# Patient Record
Sex: FEMALE | Race: WHITE | NOT HISPANIC OR LATINO | Employment: UNEMPLOYED | URBAN - METROPOLITAN AREA
[De-identification: names, ages, dates, MRNs, and addresses within clinical notes are randomized per-mention and may not be internally consistent; named-entity substitution may affect disease eponyms.]

---

## 2018-05-14 ENCOUNTER — HOSPITAL ENCOUNTER (EMERGENCY)
Facility: HOSPITAL | Age: 28
Discharge: HOME | End: 2018-05-14
Attending: EMERGENCY MEDICINE | Admitting: EMERGENCY MEDICINE
Payer: COMMERCIAL

## 2018-05-14 VITALS
BODY MASS INDEX: 20.42 KG/M2 | HEART RATE: 87 BPM | DIASTOLIC BLOOD PRESSURE: 74 MMHG | OXYGEN SATURATION: 100 % | SYSTOLIC BLOOD PRESSURE: 115 MMHG | RESPIRATION RATE: 16 BRPM | TEMPERATURE: 98 F | HEIGHT: 60 IN | WEIGHT: 104 LBS

## 2018-05-14 DIAGNOSIS — F10.929 ALCOHOL INTOXICATION (HCC): Primary | ICD-10-CM

## 2018-05-14 DIAGNOSIS — N61.1 ABSCESS OF RIGHT BREAST: ICD-10-CM

## 2018-05-14 LAB
ALBUMIN SERPL BCP-MCNC: 3.5 G/DL (ref 3.5–5)
ALP SERPL-CCNC: 136 U/L (ref 46–116)
ALT SERPL W P-5'-P-CCNC: 20 U/L (ref 12–78)
AMPHETAMINES SERPL QL SCN: NEGATIVE
ANION GAP SERPL CALCULATED.3IONS-SCNC: 6 MMOL/L (ref 4–13)
AST SERPL W P-5'-P-CCNC: 23 U/L (ref 5–45)
BARBITURATES UR QL: NEGATIVE
BASOPHILS # BLD AUTO: 0.04 THOUSANDS/ΜL (ref 0–0.1)
BASOPHILS NFR BLD AUTO: 1 % (ref 0–1)
BENZODIAZ UR QL: NEGATIVE
BILIRUB SERPL-MCNC: 0.2 MG/DL (ref 0.2–1)
BUN SERPL-MCNC: 8 MG/DL (ref 5–25)
CALCIUM SERPL-MCNC: 8.3 MG/DL (ref 8.3–10.1)
CHLORIDE SERPL-SCNC: 108 MMOL/L (ref 100–108)
CO2 SERPL-SCNC: 34 MMOL/L (ref 21–32)
COCAINE UR QL: NEGATIVE
CREAT SERPL-MCNC: 0.61 MG/DL (ref 0.6–1.3)
EOSINOPHIL # BLD AUTO: 0.04 THOUSAND/ΜL (ref 0–0.61)
EOSINOPHIL NFR BLD AUTO: 1 % (ref 0–6)
ERYTHROCYTE [DISTWIDTH] IN BLOOD BY AUTOMATED COUNT: 13.5 % (ref 11.6–15.1)
ETHANOL EXG-MCNC: 0.1 MG/DL
ETHANOL SERPL-MCNC: 290 MG/DL (ref 0–3)
EXT PREG TEST URINE: NORMAL
GFR SERPL CREATININE-BSD FRML MDRD: 123 ML/MIN/1.73SQ M
GLUCOSE SERPL-MCNC: 108 MG/DL (ref 65–140)
HCT VFR BLD AUTO: 42.3 % (ref 34.8–46.1)
HGB BLD-MCNC: 14 G/DL (ref 11.5–15.4)
LYMPHOCYTES # BLD AUTO: 1.07 THOUSANDS/ΜL (ref 0.6–4.47)
LYMPHOCYTES NFR BLD AUTO: 32 % (ref 14–44)
MAGNESIUM SERPL-MCNC: 2.4 MG/DL (ref 1.6–2.6)
MCH RBC QN AUTO: 30.4 PG (ref 26.8–34.3)
MCHC RBC AUTO-ENTMCNC: 33.1 G/DL (ref 31.4–37.4)
MCV RBC AUTO: 92 FL (ref 82–98)
METHADONE UR QL: NEGATIVE
MONOCYTES # BLD AUTO: 0.38 THOUSAND/ΜL (ref 0.17–1.22)
MONOCYTES NFR BLD AUTO: 11 % (ref 4–12)
NEUTROPHILS # BLD AUTO: 1.81 THOUSANDS/ΜL (ref 1.85–7.62)
NEUTS SEG NFR BLD AUTO: 55 % (ref 43–75)
OPIATES UR QL SCN: NEGATIVE
PCP UR QL: NEGATIVE
PLATELET # BLD AUTO: 217 THOUSANDS/UL (ref 149–390)
PMV BLD AUTO: 9.8 FL (ref 8.9–12.7)
POTASSIUM SERPL-SCNC: 3.7 MMOL/L (ref 3.5–5.3)
PROT SERPL-MCNC: 7.5 G/DL (ref 6.4–8.2)
RBC # BLD AUTO: 4.61 MILLION/UL (ref 3.81–5.12)
SODIUM SERPL-SCNC: 148 MMOL/L (ref 136–145)
THC UR QL: NEGATIVE
WBC # BLD AUTO: 3.34 THOUSAND/UL (ref 4.31–10.16)

## 2018-05-14 PROCEDURE — 93005 ELECTROCARDIOGRAM TRACING: CPT

## 2018-05-14 PROCEDURE — 85025 COMPLETE CBC W/AUTO DIFF WBC: CPT | Performed by: EMERGENCY MEDICINE

## 2018-05-14 PROCEDURE — 82075 ASSAY OF BREATH ETHANOL: CPT | Performed by: EMERGENCY MEDICINE

## 2018-05-14 PROCEDURE — 96360 HYDRATION IV INFUSION INIT: CPT

## 2018-05-14 PROCEDURE — 83735 ASSAY OF MAGNESIUM: CPT | Performed by: EMERGENCY MEDICINE

## 2018-05-14 PROCEDURE — 80307 DRUG TEST PRSMV CHEM ANLYZR: CPT | Performed by: EMERGENCY MEDICINE

## 2018-05-14 PROCEDURE — 80320 DRUG SCREEN QUANTALCOHOLS: CPT | Performed by: EMERGENCY MEDICINE

## 2018-05-14 PROCEDURE — 36415 COLL VENOUS BLD VENIPUNCTURE: CPT | Performed by: EMERGENCY MEDICINE

## 2018-05-14 PROCEDURE — 99284 EMERGENCY DEPT VISIT MOD MDM: CPT

## 2018-05-14 PROCEDURE — 80053 COMPREHEN METABOLIC PANEL: CPT | Performed by: EMERGENCY MEDICINE

## 2018-05-14 PROCEDURE — 96361 HYDRATE IV INFUSION ADD-ON: CPT

## 2018-05-14 PROCEDURE — 81025 URINE PREGNANCY TEST: CPT | Performed by: EMERGENCY MEDICINE

## 2018-05-14 RX ORDER — SULFAMETHOXAZOLE AND TRIMETHOPRIM 800; 160 MG/1; MG/1
1 TABLET ORAL EVERY 12 HOURS SCHEDULED
Qty: 14 TABLET | Refills: 0 | Status: SHIPPED | OUTPATIENT
Start: 2018-05-14 | End: 2018-05-21

## 2018-05-14 RX ORDER — LIDOCAINE HYDROCHLORIDE 10 MG/ML
5 INJECTION, SOLUTION EPIDURAL; INFILTRATION; INTRACAUDAL; PERINEURAL ONCE
Status: COMPLETED | OUTPATIENT
Start: 2018-05-14 | End: 2018-05-14

## 2018-05-14 RX ORDER — CEPHALEXIN 500 MG/1
500 CAPSULE ORAL EVERY 6 HOURS SCHEDULED
Qty: 40 CAPSULE | Refills: 0 | Status: SHIPPED | OUTPATIENT
Start: 2018-05-14 | End: 2018-05-17

## 2018-05-14 RX ORDER — SULFAMETHOXAZOLE AND TRIMETHOPRIM 800; 160 MG/1; MG/1
1 TABLET ORAL ONCE
Status: COMPLETED | OUTPATIENT
Start: 2018-05-14 | End: 2018-05-14

## 2018-05-14 RX ORDER — THIAMINE MONONITRATE (VIT B1) 100 MG
100 TABLET ORAL ONCE
Status: COMPLETED | OUTPATIENT
Start: 2018-05-14 | End: 2018-05-14

## 2018-05-14 RX ORDER — CEPHALEXIN 250 MG/1
500 CAPSULE ORAL ONCE
Status: COMPLETED | OUTPATIENT
Start: 2018-05-14 | End: 2018-05-14

## 2018-05-14 RX ADMIN — LIDOCAINE HYDROCHLORIDE 5 ML: 10 INJECTION, SOLUTION EPIDURAL; INFILTRATION; INTRACAUDAL; PERINEURAL at 13:11

## 2018-05-14 RX ADMIN — SULFAMETHOXAZOLE AND TRIMETHOPRIM 1 TABLET: 800; 160 TABLET ORAL at 12:51

## 2018-05-14 RX ADMIN — CEPHALEXIN 500 MG: 250 CAPSULE ORAL at 12:51

## 2018-05-14 RX ADMIN — Medication 100 MG: at 12:51

## 2018-05-14 RX ADMIN — SODIUM CHLORIDE 1000 ML: 0.9 INJECTION, SOLUTION INTRAVENOUS at 12:46

## 2018-05-14 NOTE — ED PROVIDER NOTES
History  Chief Complaint   Patient presents with    Breast Pain     Pt c/o pain under right breast that started about 2 weeks ago  Pt states she's been taking advil for the pain  Patient complains of right breast abscess for about 1 week draining purulent drainage  Daily alcohol but denies SI/ HI and doesn't want to go to detox or rehab  Patient with left eye bruising states from fall 1 week ago tripped on curb and face hit concrete - did not have LOC or headache  None       Past Medical History:   Diagnosis Date    Heart murmur        Past Surgical History:   Procedure Laterality Date    CARDIAC SURGERY         History reviewed  No pertinent family history  I have reviewed and agree with the history as documented  Social History   Substance Use Topics    Smoking status: Current Every Day Smoker     Types: Cigarettes    Smokeless tobacco: Never Used    Alcohol use Yes      Comment: Pt states she "drinks a lot "        Review of Systems   All other systems reviewed and are negative  Physical Exam  ED Triage Vitals [05/14/18 1155]   Temperature Pulse Respirations Blood Pressure SpO2   98 °F (36 7 °C) 81 18 107/76 100 %      Temp Source Heart Rate Source Patient Position - Orthostatic VS BP Location FiO2 (%)   Temporal Monitor Lying Right arm --      Pain Score       2           Orthostatic Vital Signs  Vitals:    05/14/18 1415 05/14/18 1606 05/14/18 1700 05/14/18 1730   BP: 110/73 93/51 107/69 115/74   Pulse: 86 86 87 87   Patient Position - Orthostatic VS:           Physical Exam   Constitutional: She is oriented to person, place, and time  She appears well-developed and well-nourished  HENT:   Head:       Nose: No sinus tenderness or nasal septal hematoma  Mouth/Throat: Oropharynx is clear and moist    Non tender left periorbital bruising   Eyes: Conjunctivae and EOM are normal  Pupils are equal, round, and reactive to light  Right conjunctiva is not injected   Left conjunctiva is not injected  Right eye exhibits normal extraocular motion  Left eye exhibits normal extraocular motion  Slit lamp exam:       The right eye shows no hyphema  The left eye shows no hyphema  Neck: Normal range of motion  Neck supple  No spinous process tenderness present  Cardiovascular: Normal rate, regular rhythm, normal heart sounds and intact distal pulses  Pulmonary/Chest: Effort normal and breath sounds normal  No respiratory distress  She has no wheezes  Abdominal: Soft  Bowel sounds are normal  She exhibits no distension  There is no tenderness  Musculoskeletal: Normal range of motion  Neurological: She is alert and oriented to person, place, and time  She has normal strength  No cranial nerve deficit or sensory deficit  She displays a negative Romberg sign  GCS eye subscore is 4  GCS verbal subscore is 5  GCS motor subscore is 6  Skin: Skin is warm and dry  No rash noted  Psychiatric: Her speech is slurred  Cognition and memory are normal  She expresses impulsivity  She exhibits a depressed mood  She expresses no suicidal plans and no homicidal plans  Nursing note and vitals reviewed        ED Medications  Medications   cephalexin (KEFLEX) capsule 500 mg (500 mg Oral Given 5/14/18 1251)   sulfamethoxazole-trimethoprim (BACTRIM DS) 800-160 mg per tablet 1 tablet (1 tablet Oral Given 5/14/18 1251)   lidocaine (PF) (XYLOCAINE-MPF) 1 % injection 5 mL (5 mL Infiltration Given 5/14/18 1311)   thiamine (VITAMIN B1) tablet 100 mg (100 mg Oral Given 5/14/18 1251)   sodium chloride 0 9 % bolus 1,000 mL (0 mL Intravenous Stopped 5/14/18 1823)       Diagnostic Studies  Results Reviewed     Procedure Component Value Units Date/Time    POCT alcohol breath test [[id number]]  (Normal) Resulted:  05/14/18 1801    Lab Status:  Final result Updated:  05/14/18 1801     EXTBreath Alcohol 0 103    Ethanol [[id number]]  (Abnormal) Collected:  05/14/18 1243    Lab Status:  Final result Specimen: Blood from Arm, Left Updated:  05/14/18 1322     Ethanol Lvl 290 (H) mg/dL     Comprehensive metabolic panel [[id number]]  (Abnormal) Collected:  05/14/18 1243    Lab Status:  Final result Specimen:  Blood from Arm, Left Updated:  05/14/18 1315     Sodium 148 (H) mmol/L      Potassium 3 7 mmol/L      Chloride 108 mmol/L      CO2 34 (H) mmol/L      Anion Gap 6 mmol/L      BUN 8 mg/dL      Creatinine 0 61 mg/dL      Glucose 108 mg/dL      Calcium 8 3 mg/dL      AST 23 U/L      ALT 20 U/L      Alkaline Phosphatase 136 (H) U/L      Total Protein 7 5 g/dL      Albumin 3 5 g/dL      Total Bilirubin 0 20 mg/dL      eGFR 123 ml/min/1 73sq m     Narrative:         National Kidney Disease Education Program recommendations are as follows:  GFR calculation is accurate only with a steady state creatinine  Chronic Kidney disease less than 60 ml/min/1 73 sq  meters  Kidney failure less than 15 ml/min/1 73 sq  meters  Magnesium [[id number]]  (Normal) Collected:  05/14/18 1243    Lab Status:  Final result Specimen:  Blood from Arm, Left Updated:  05/14/18 1315     Magnesium 2 4 mg/dL     Rapid drug screen, urine [[id number]]  (Normal) Collected:  05/14/18 1241    Lab Status:  Final result Specimen:  Urine from Urine, Clean Catch Updated:  05/14/18 1304     Amph/Meth UR Negative     Barbiturate Ur Negative     Benzodiazepine Urine Negative     Cocaine Urine Negative     Methadone Urine Negative     Opiate Urine Negative     PCP Ur Negative     THC Urine Negative    Narrative:         FOR MEDICAL PURPOSES ONLY  IF CONFIRMATION NEEDED PLEASE CONTACT THE LAB WITHIN 5 DAYS      Drug Screen Cutoff Levels:  AMPHETAMINE/METHAMPHETAMINES  1000 ng/mL  BARBITURATES     200 ng/mL  BENZODIAZEPINES     200 ng/mL  COCAINE      300 ng/mL  METHADONE      300 ng/mL  OPIATES      300 ng/mL  PHENCYCLIDINE     25 ng/mL  THC       50 ng/mL    CBC and differential [[id number]]  (Abnormal) Collected:  05/14/18 1243    Lab Status:  Final result Specimen:  Blood from Arm, Left Updated:  05/14/18 1259     WBC 3 34 (L) Thousand/uL      RBC 4 61 Million/uL      Hemoglobin 14 0 g/dL      Hematocrit 42 3 %      MCV 92 fL      MCH 30 4 pg      MCHC 33 1 g/dL      RDW 13 5 %      MPV 9 8 fL      Platelets 483 Thousands/uL      Neutrophils Relative 55 %      Lymphocytes Relative 32 %      Monocytes Relative 11 %      Eosinophils Relative 1 %      Basophils Relative 1 %      Neutrophils Absolute 1 81 (L) Thousands/µL      Lymphocytes Absolute 1 07 Thousands/µL      Monocytes Absolute 0 38 Thousand/µL      Eosinophils Absolute 0 04 Thousand/µL      Basophils Absolute 0 04 Thousands/µL     POCT pregnancy, urine [[id number]]  (Normal) Resulted:  05/14/18 1255    Lab Status:  Final result Updated:  05/14/18 1255     EXT PREG TEST UR (Ref: Negative) NEG                 No orders to display              Procedures  ECG 12 Lead Documentation  Date/Time: 5/14/2018 8:24 PM  Performed by: Fidel Romero by: Lisseth Lucia     Indications / Diagnosis:  Breast pain  ECG reviewed by me, the ED Provider: yes    Patient location:  ED  Previous ECG:     Previous ECG:  Unavailable  Interpretation:     Interpretation: normal    Quality:     Tracing quality:  Limited by artifact  Rate:     ECG rate:  83    ECG rate assessment: normal    Rhythm:     Rhythm: sinus rhythm    Ectopy:     Ectopy: none    QRS:     QRS axis:  Normal    QRS intervals:  Normal  Conduction:     Conduction: abnormal      Abnormal conduction: complete RBBB    ST segments:     ST segments:  Normal  T waves:     T waves: normal      Incision/Drainage  Date/Time: 5/14/2018 12:35 PM  Performed by: Fidel Romero by: Lisseth Lucia     Patient location:  ED  Consent:     Consent obtained:  Verbal    Consent given by:  Patient    Risks discussed:  Incomplete drainage, infection and pain    Alternatives discussed:  No treatment  Universal protocol:     Procedure explained and questions answered to patient or proxy's satisfaction: yes      Patient identity confirmed:  Verbally with patient  Location:     Type:  Abscess    Size:  2 cm    Location:  Trunk    Trunk location:  R breast  Pre-procedure details:     Skin preparation:  Betadine  Anesthesia (see MAR for exact dosages): Anesthesia method:  Local infiltration    Local anesthetic:  Lidocaine 1% w/o epi  Procedure details:     Complexity:  Simple    Incision types:  Stab incision    Scalpel blade:  10    Approach:  Open    Incision depth:  Skin    Wound management:  Probed and deloculated, irrigated with saline, extensive cleaning and debrided    Drainage:  Serosanguinous    Drainage amount:  Scant    Wound treatment:  Packing placed    Packing materials:  1/2 in gauze    Amount 1/2":  1 inch  Post-procedure details:     Patient tolerance of procedure: Tolerated well, no immediate complications           Phone Contacts  ED Phone Contact    ED Course  ED Course as of May 14 2125   Mon May 14, 2018   1410 Will repeat alcohol level around 6 pm                                MDM  Number of Diagnoses or Management Options  Abscess of right breast: new and requires workup  Alcohol intoxication [facility]): new and requires workup     Amount and/or Complexity of Data Reviewed  Clinical lab tests: ordered and reviewed    Patient Progress  Patient progress: improved    CritCare Time    Disposition  Final diagnoses:   Alcohol intoxication ([address] )   Abscess of right breast     Time reflects when diagnosis was documented in both MDM as applicable and the Disposition within this note     Time User Action Codes Description Comment    5/14/2018  6:06 PM Truong Rojas [F10 [id number]] Alcohol intoxication ([address] )     5/14/2018  6:06 PM Truong Rojas [N61 1] Abscess of right breast       ED Disposition     ED Disposition Condition Comment    Discharge  [facility] discharge to home/self care      Condition at discharge: Good        Follow-up Information     Follow up With Specialties Details Why Contact Info Additional Information    [facility] Emergency Medicine In 2 days  [address]  [address]  [phone] [address], [facility], [place], [place], [address]        Discharge Medication List as of 5/14/2018  6:10 PM      START taking these medications    Details   cephalexin (KEFLEX) 500 mg capsule Take 1 capsule (500 mg total) by mouth every 6 (six) hours for 10 doses, Starting Mon 5/14/2018, Until u 5/17/2018, Print      sulfamethoxazole-trimethoprim (BACTRIM DS) 800-160 mg per tablet Take 1 tablet by mouth every 12 (twelve) hours for 7 days, Starting Mon 5/14/2018, Until Mon 5/21/2018, Print           No discharge procedures on file      ED Provider  Electronically Signed by     Kaykay Salazar DO  05/14/18 4765

## 2018-05-14 NOTE — ED NOTES
In to lace abscess to breast  This nurse in the room with  during procedure  Education provided on care of wound  Will reeducate when patient more coherent  Patient given phone so she can speak with mother        Joceline Gomez RN  05/14/18 9359

## 2018-05-14 NOTE — ED NOTES
Patient states understanding on how to care for dressing and to follow up care        Юлия White RN  05/14/18 3817

## 2018-05-14 NOTE — DISCHARGE INSTRUCTIONS
Alcohol Intoxication   WHAT YOU NEED TO KNOW:   What is alcohol intoxication? Alcohol intoxication is a harmful physical condition caused when you drink more alcohol than your body can handle  It is also called ethanol poisoning, or being drunk  What are the physical signs and symptoms of alcohol intoxication? · Breath that smells like alcohol     · Blackouts or seizures    · Enlarged pupils    · Eye movements that are faster than normal for you    · Fast heartbeats and slow breaths     · Loss of balance, or no ability to walk straight or stand still    · Nausea and vomiting     · Slurred or loud speech  What behaviors are common with alcohol intoxication? · Quick mood changes: You feel happy and quickly become angry, or you easily become sad  You may act out violently  · Risky sexual behavior:  You have sex that is not protected, or you have sex with many people  · Work or school trouble: You have many absences or do not finish your work  How is alcohol intoxication diagnosed? Your healthcare provider will examine you  He will ask about your signs and symptoms and use of alcohol  These questions may include how much, how often, and what kind of alcohol you drink  He may ask you questions to test your memory and judgment  He may also send blood or urine samples to a lab  The samples are tested for alcohol and for signs of liver, kidney, or heart damage caused by alcohol  You may need to have these tests more than once  How is alcohol intoxication treated? · Medicines:      ¨ Sedative: This medicine is given to help you stay calm and relaxed  ¨ Antinausea medicine: This medicine may be given to calm your stomach and prevent vomiting  ¨ Glucose: This medicine may be given to increase the amount of sugar in your blood  ¨ Vitamin B1:  This is also called Thiamine  You may be given vitamin B1 if your levels are low from excess alcohol      · Brief intervention therapy:  A healthcare provider meets with you to discuss ways to control your risky behaviors, such as drinking and driving  This therapy also helps you set goals to decrease the amount of alcohol you drink  · Breathing support: You may need the following if you are so intoxicated that you cannot breathe well on your own:     Nuno Saliva You may need extra oxygen  if your blood oxygen level is lower than it should be  You may get oxygen through a mask placed over your nose and mouth or through small tubes placed in your nostrils  Ask your healthcare provider before you take off the mask or oxygen tubing  ¨ A ventilator  is a machine that gives you oxygen and breathes for you when you cannot breathe well on your own  An endotracheal (ET) tube is put into your mouth or nose and attached to the ventilator  You may need a trach if an ET tube cannot be placed  A trach is a tube put through an incision and into your windpipe  What are the risks of alcohol intoxication? Alcohol intoxication puts you at risk for disease and injury  Alcohol can damage your brain, liver, heart, kidneys, and lungs  You may be more likely to act violently when you are intoxicated  You may break the law, or harm yourself and others  Risky sexual behavior could lead to sexually transmitted diseases (STDs)  Alcohol intoxication and poisoning can put you into a coma (sleep that you cannot wake up from) and may be life-threatening  Where can I find more information? · Alcoholics Anonymous  Web Address: http://www braun info/  When should I contact my healthcare provider? Contact your healthcare provider if:  · You need help to stop drinking alcohol  · You have trouble with work or school because you drink too much alcohol  · You have physical or verbal fights because of alcohol  · You have questions or concerns about your condition or care  When should I seek immediate care?   Seek care immediately or call 911 if:  · You have sudden trouble breathing or chest pain  · You have a seizure  · You feel sad enough to harm yourself or others  · You have hallucinations (you see or hear things that are not real)  · You cannot stop vomiting  · You were in an accident because of alcohol  CARE AGREEMENT:   You have the right to help plan your care  Learn about your health condition and how it may be treated  Discuss treatment options with your caregivers to decide what care you want to receive  You always have the right to refuse treatment  The above information is an  only  It is not intended as medical advice for individual conditions or treatments  Talk to your doctor, nurse or pharmacist before following any medical regimen to see if it is safe and effective for you  © 2017 [address]  Information is for End User's use only and may not be sold, redistributed or otherwise used for commercial purposes  All illustrations and images included in CareNotes® are the copyrighted property of [employer]  or [employer]  Abscess   WHAT YOU NEED TO KNOW:   What is an abscess? An abscess is an area under the skin where pus (infected fluid) collects  An abscess is often caused by bacteria, fungi or other germs that get into an open wound  You can get an abscess anywhere on your body  What increases my risk for an abscess? · An animal bite    · A foreign object lodged under your skin    · Heavy or frequent sweating    · A health problem, such as diabetes or obesity    · Injecting illegal drugs  What are the signs and symptoms of an abscess? You may have a swollen mass that is red and painful  Pus may leak out of the mass  The pus will be white or yellow and may smell bad  You may have redness and pain days before the mass appears  You may have a fever and chills if the infection spreads  How is an abscess diagnosed? Your healthcare provider will examine the area   He will check to see if your abscess is draining  A sample of fluid from your abscess may show what is causing your infection  How is an abscess treated? · Incision and drainage  is a procedure used to remove pus and fluid from the abscess  Your healthcare provider will make a cut in the abscess so it can drain  Then gauze will be put into the wound and it will be covered with a bandage  · Surgery  may be needed to remove your abscess  Your healthcare provider may do this if the abscess is on your hands or buttocks  Surgery can decrease the risk that the abscess will come back  What can I do to care for my self? · Apply a warm compress to your abscess  This will help it open and drain  Wet a washcloth in warm, but not hot, water  Apply the compress for 10 minutes  Repeat this 4 times each day  Do not  press on an abscess or try to open it with a needle  You may push the bacteria deeper or into your blood  · Do not share your clothes, towels, or sheets with anyone  This can spread the infection to others  · Wash your hands often  This can help prevent the spread of germs  Use soap and water or an alcohol-based hand rub  What can I do to care for my wound after it is drained? · Care for your wound as directed  If your healthcare provider says it is okay, carefully remove the bandage and gauze packing  You may need to soak the gauze to get it out of your wound  Clean your wound and the area around it as directed  Dry the area and put on new, clean bandages  Change your bandages when they get wet or dirty  · Ask your healthcare provider how to change the gauze in your wound  Keep track of how many pieces of gauze are placed inside the wound  Do not put too much packing in the wound  Do not pack the gauze too tightly in your wound  When should I seek immediate care? · The area around your abscess becomes very painful, warm, or has red streaks  · You have a fever and chills      · Your heart is beating faster than usual      · You feel faint or confused  When should I contact my healthcare provider? · Your abscess gets bigger  · Your abscess returns  · You have questions or concerns about your condition or care  CARE AGREEMENT:   You have the right to help plan your care  Learn about your health condition and how it may be treated  Discuss treatment options with your caregivers to decide what care you want to receive  You always have the right to refuse treatment  The above information is an  only  It is not intended as medical advice for individual conditions or treatments  Talk to your doctor, nurse or pharmacist before following any medical regimen to see if it is safe and effective for you  © 2017 [address] [facility] Information is for End User's use only and may not be sold, redistributed or otherwise used for commercial purposes  All illustrations and images included in CareNotes® are the copyrighted property of [employer] M [employer]  or [employer] Torres

## 2018-05-15 LAB
ATRIAL RATE: 83 BPM
P AXIS: 62 DEGREES
PR INTERVAL: 118 MS
QRS AXIS: -14 DEGREES
QRSD INTERVAL: 138 MS
QT INTERVAL: 414 MS
QTC INTERVAL: 486 MS
T WAVE AXIS: 72 DEGREES
VENTRICULAR RATE: 83 BPM

## 2018-05-15 PROCEDURE — 93010 ELECTROCARDIOGRAM REPORT: CPT | Performed by: INTERNAL MEDICINE

## 2018-07-21 ENCOUNTER — HOSPITAL ENCOUNTER (INPATIENT)
Facility: HOSPITAL | Age: 28
LOS: 1 days | Discharge: LEFT BEFORE DISCHARGE | DRG: 770 | End: 2018-07-22
Attending: EMERGENCY MEDICINE | Admitting: INTERNAL MEDICINE
Payer: MEDICARE

## 2018-07-21 DIAGNOSIS — R57.1 HYPOVOLEMIC SHOCK (HCC): ICD-10-CM

## 2018-07-21 DIAGNOSIS — R94.31 PROLONGED QT INTERVAL: ICD-10-CM

## 2018-07-21 DIAGNOSIS — F10.929 ACUTE ALCOHOL INTOXICATION (HCC): Primary | ICD-10-CM

## 2018-07-21 DIAGNOSIS — F19.10 POLYSUBSTANCE ABUSE (HCC): ICD-10-CM

## 2018-07-21 DIAGNOSIS — E86.0 DEHYDRATION: ICD-10-CM

## 2018-07-21 PROCEDURE — 96372 THER/PROPH/DIAG INJ SC/IM: CPT

## 2018-07-21 PROCEDURE — 81025 URINE PREGNANCY TEST: CPT | Performed by: EMERGENCY MEDICINE

## 2018-07-21 RX ORDER — LORAZEPAM 2 MG/ML
2 INJECTION INTRAMUSCULAR ONCE
Status: COMPLETED | OUTPATIENT
Start: 2018-07-21 | End: 2018-07-21

## 2018-07-21 RX ORDER — HALOPERIDOL 5 MG/ML
5 INJECTION INTRAMUSCULAR ONCE
Status: COMPLETED | OUTPATIENT
Start: 2018-07-22 | End: 2018-07-21

## 2018-07-21 RX ADMIN — LORAZEPAM 2 MG: 2 INJECTION, SOLUTION INTRAMUSCULAR; INTRAVENOUS at 23:47

## 2018-07-21 RX ADMIN — HALOPERIDOL LACTATE 5 MG: 5 INJECTION INTRAMUSCULAR at 23:56

## 2018-07-22 VITALS
WEIGHT: 104.06 LBS | TEMPERATURE: 97 F | SYSTOLIC BLOOD PRESSURE: 109 MMHG | OXYGEN SATURATION: 99 % | DIASTOLIC BLOOD PRESSURE: 66 MMHG | HEIGHT: 60 IN | RESPIRATION RATE: 18 BRPM | BODY MASS INDEX: 20.43 KG/M2 | HEART RATE: 89 BPM

## 2018-07-22 PROBLEM — F10.929 ALCOHOLIC INTOXICATION WITH COMPLICATION (HCC): Status: ACTIVE | Noted: 2018-07-22

## 2018-07-22 PROBLEM — T50.901A OVERDOSE: Status: ACTIVE | Noted: 2018-07-22

## 2018-07-22 LAB
ALBUMIN SERPL BCP-MCNC: 3.7 G/DL (ref 3.5–5)
ALP SERPL-CCNC: 167 U/L (ref 46–116)
ALT SERPL W P-5'-P-CCNC: 48 U/L (ref 12–78)
AMPHETAMINES SERPL QL SCN: NEGATIVE
ANION GAP SERPL CALCULATED.3IONS-SCNC: 12 MMOL/L (ref 4–13)
ANION GAP SERPL CALCULATED.3IONS-SCNC: 7 MMOL/L (ref 4–13)
ANION GAP SERPL CALCULATED.3IONS-SCNC: 8 MMOL/L (ref 4–13)
ANION GAP SERPL CALCULATED.3IONS-SCNC: 9 MMOL/L (ref 4–13)
APAP SERPL-MCNC: <2 UG/ML (ref 10–30)
AST SERPL W P-5'-P-CCNC: 67 U/L (ref 5–45)
BARBITURATES UR QL: NEGATIVE
BASOPHILS # BLD AUTO: 0.02 THOUSANDS/ΜL (ref 0–0.1)
BASOPHILS # BLD AUTO: 0.05 THOUSANDS/ΜL (ref 0–0.1)
BASOPHILS NFR BLD AUTO: 0 % (ref 0–1)
BASOPHILS NFR BLD AUTO: 1 % (ref 0–1)
BENZODIAZ UR QL: NEGATIVE
BILIRUB SERPL-MCNC: 0.3 MG/DL (ref 0.2–1)
BUN SERPL-MCNC: 2 MG/DL (ref 5–25)
BUN SERPL-MCNC: 4 MG/DL (ref 5–25)
CALCIUM SERPL-MCNC: 6.1 MG/DL (ref 8.3–10.1)
CALCIUM SERPL-MCNC: 7.1 MG/DL (ref 8.3–10.1)
CALCIUM SERPL-MCNC: 7.8 MG/DL (ref 8.3–10.1)
CALCIUM SERPL-MCNC: 8.2 MG/DL (ref 8.3–10.1)
CHLORIDE SERPL-SCNC: 105 MMOL/L (ref 100–108)
CHLORIDE SERPL-SCNC: 109 MMOL/L (ref 100–108)
CHLORIDE SERPL-SCNC: 109 MMOL/L (ref 100–108)
CHLORIDE SERPL-SCNC: 115 MMOL/L (ref 100–108)
CO2 SERPL-SCNC: 26 MMOL/L (ref 21–32)
CO2 SERPL-SCNC: 26 MMOL/L (ref 21–32)
CO2 SERPL-SCNC: 27 MMOL/L (ref 21–32)
CO2 SERPL-SCNC: 27 MMOL/L (ref 21–32)
COCAINE UR QL: POSITIVE
CREAT SERPL-MCNC: 0.39 MG/DL (ref 0.6–1.3)
CREAT SERPL-MCNC: 0.46 MG/DL (ref 0.6–1.3)
CREAT SERPL-MCNC: 0.46 MG/DL (ref 0.6–1.3)
CREAT SERPL-MCNC: 0.5 MG/DL (ref 0.6–1.3)
EOSINOPHIL # BLD AUTO: 0.09 THOUSAND/ΜL (ref 0–0.61)
EOSINOPHIL # BLD AUTO: 0.15 THOUSAND/ΜL (ref 0–0.61)
EOSINOPHIL NFR BLD AUTO: 2 % (ref 0–6)
EOSINOPHIL NFR BLD AUTO: 3 % (ref 0–6)
ERYTHROCYTE [DISTWIDTH] IN BLOOD BY AUTOMATED COUNT: 13.9 % (ref 11.6–15.1)
ERYTHROCYTE [DISTWIDTH] IN BLOOD BY AUTOMATED COUNT: 14.3 % (ref 11.6–15.1)
ERYTHROCYTE [DISTWIDTH] IN BLOOD BY AUTOMATED COUNT: 14.5 % (ref 11.6–15.1)
ETHANOL SERPL-MCNC: 366 MG/DL (ref 0–3)
ETHANOL SERPL-MCNC: 87 MG/DL (ref 0–3)
ETHANOL SERPL-MCNC: <3 MG/DL (ref 0–3)
EXT PREG TEST URINE: NEGATIVE
GFR SERPL CREATININE-BSD FRML MDRD: 131 ML/MIN/1.73SQ M
GFR SERPL CREATININE-BSD FRML MDRD: 135 ML/MIN/1.73SQ M
GFR SERPL CREATININE-BSD FRML MDRD: 135 ML/MIN/1.73SQ M
GFR SERPL CREATININE-BSD FRML MDRD: 142 ML/MIN/1.73SQ M
GLUCOSE SERPL-MCNC: 102 MG/DL (ref 65–140)
GLUCOSE SERPL-MCNC: 103 MG/DL (ref 65–140)
GLUCOSE SERPL-MCNC: 88 MG/DL (ref 65–140)
GLUCOSE SERPL-MCNC: 94 MG/DL (ref 65–140)
HCT VFR BLD AUTO: 35.8 % (ref 34.8–46.1)
HCT VFR BLD AUTO: 36.6 % (ref 34.8–46.1)
HCT VFR BLD AUTO: 41.9 % (ref 34.8–46.1)
HGB BLD-MCNC: 11.8 G/DL (ref 11.5–15.4)
HGB BLD-MCNC: 12.2 G/DL (ref 11.5–15.4)
HGB BLD-MCNC: 14 G/DL (ref 11.5–15.4)
IMM GRANULOCYTES # BLD AUTO: 0.02 THOUSAND/UL (ref 0–0.2)
IMM GRANULOCYTES # BLD AUTO: 0.03 THOUSAND/UL (ref 0–0.2)
IMM GRANULOCYTES NFR BLD AUTO: 0 % (ref 0–2)
IMM GRANULOCYTES NFR BLD AUTO: 1 % (ref 0–2)
LYMPHOCYTES # BLD AUTO: 0.51 THOUSANDS/ΜL (ref 0.6–4.47)
LYMPHOCYTES # BLD AUTO: 1.49 THOUSANDS/ΜL (ref 0.6–4.47)
LYMPHOCYTES NFR BLD AUTO: 28 % (ref 14–44)
LYMPHOCYTES NFR BLD AUTO: 9 % (ref 14–44)
MCH RBC QN AUTO: 29.7 PG (ref 26.8–34.3)
MCH RBC QN AUTO: 29.8 PG (ref 26.8–34.3)
MCH RBC QN AUTO: 30.1 PG (ref 26.8–34.3)
MCHC RBC AUTO-ENTMCNC: 33 G/DL (ref 31.4–37.4)
MCHC RBC AUTO-ENTMCNC: 33.3 G/DL (ref 31.4–37.4)
MCHC RBC AUTO-ENTMCNC: 33.4 G/DL (ref 31.4–37.4)
MCV RBC AUTO: 89 FL (ref 82–98)
MCV RBC AUTO: 90 FL (ref 82–98)
MCV RBC AUTO: 90 FL (ref 82–98)
METHADONE UR QL: NEGATIVE
MONOCYTES # BLD AUTO: 0.58 THOUSAND/ΜL (ref 0.17–1.22)
MONOCYTES # BLD AUTO: 0.58 THOUSAND/ΜL (ref 0.17–1.22)
MONOCYTES NFR BLD AUTO: 10 % (ref 4–12)
MONOCYTES NFR BLD AUTO: 11 % (ref 4–12)
NEUTROPHILS # BLD AUTO: 3.02 THOUSANDS/ΜL (ref 1.85–7.62)
NEUTROPHILS # BLD AUTO: 4.74 THOUSANDS/ΜL (ref 1.85–7.62)
NEUTS SEG NFR BLD AUTO: 57 % (ref 43–75)
NEUTS SEG NFR BLD AUTO: 78 % (ref 43–75)
NRBC BLD AUTO-RTO: 0 /100 WBCS
NRBC BLD AUTO-RTO: 0 /100 WBCS
OPIATES UR QL SCN: NEGATIVE
PCP UR QL: NEGATIVE
PLATELET # BLD AUTO: 154 THOUSANDS/UL (ref 149–390)
PLATELET # BLD AUTO: 231 THOUSANDS/UL (ref 149–390)
PLATELET # BLD AUTO: 74 THOUSANDS/UL (ref 149–390)
PMV BLD AUTO: 10.1 FL (ref 8.9–12.7)
PMV BLD AUTO: 11.7 FL (ref 8.9–12.7)
PMV BLD AUTO: 9.9 FL (ref 8.9–12.7)
POTASSIUM SERPL-SCNC: 2.8 MMOL/L (ref 3.5–5.3)
POTASSIUM SERPL-SCNC: 3.3 MMOL/L (ref 3.5–5.3)
POTASSIUM SERPL-SCNC: 3.7 MMOL/L (ref 3.5–5.3)
POTASSIUM SERPL-SCNC: 4 MMOL/L (ref 3.5–5.3)
PROT SERPL-MCNC: 7.1 G/DL (ref 6.4–8.2)
RBC # BLD AUTO: 3.96 MILLION/UL (ref 3.81–5.12)
RBC # BLD AUTO: 4.05 MILLION/UL (ref 3.81–5.12)
RBC # BLD AUTO: 4.71 MILLION/UL (ref 3.81–5.12)
SALICYLATES SERPL-MCNC: <3 MG/DL (ref 3–20)
SODIUM SERPL-SCNC: 141 MMOL/L (ref 136–145)
SODIUM SERPL-SCNC: 143 MMOL/L (ref 136–145)
SODIUM SERPL-SCNC: 147 MMOL/L (ref 136–145)
SODIUM SERPL-SCNC: 149 MMOL/L (ref 136–145)
THC UR QL: NEGATIVE
TSH SERPL DL<=0.05 MIU/L-ACNC: 1.49 UIU/ML (ref 0.36–3.74)
WBC # BLD AUTO: 5.31 THOUSAND/UL (ref 4.31–10.16)
WBC # BLD AUTO: 5.97 THOUSAND/UL (ref 4.31–10.16)
WBC # BLD AUTO: 6.44 THOUSAND/UL (ref 4.31–10.16)

## 2018-07-22 PROCEDURE — 96365 THER/PROPH/DIAG IV INF INIT: CPT

## 2018-07-22 PROCEDURE — 85025 COMPLETE CBC W/AUTO DIFF WBC: CPT | Performed by: EMERGENCY MEDICINE

## 2018-07-22 PROCEDURE — 36415 COLL VENOUS BLD VENIPUNCTURE: CPT | Performed by: EMERGENCY MEDICINE

## 2018-07-22 PROCEDURE — 96360 HYDRATION IV INFUSION INIT: CPT

## 2018-07-22 PROCEDURE — 84443 ASSAY THYROID STIM HORMONE: CPT | Performed by: INTERNAL MEDICINE

## 2018-07-22 PROCEDURE — 80048 BASIC METABOLIC PNL TOTAL CA: CPT | Performed by: INTERNAL MEDICINE

## 2018-07-22 PROCEDURE — 80320 DRUG SCREEN QUANTALCOHOLS: CPT | Performed by: INTERNAL MEDICINE

## 2018-07-22 PROCEDURE — 99220 PR INITIAL OBSERVATION CARE/DAY 70 MINUTES: CPT | Performed by: INTERNAL MEDICINE

## 2018-07-22 PROCEDURE — 85025 COMPLETE CBC W/AUTO DIFF WBC: CPT | Performed by: INTERNAL MEDICINE

## 2018-07-22 PROCEDURE — 80307 DRUG TEST PRSMV CHEM ANLYZR: CPT | Performed by: EMERGENCY MEDICINE

## 2018-07-22 PROCEDURE — 99252 IP/OBS CONSLTJ NEW/EST SF 35: CPT | Performed by: PSYCHIATRY & NEUROLOGY

## 2018-07-22 PROCEDURE — 80320 DRUG SCREEN QUANTALCOHOLS: CPT | Performed by: PSYCHIATRY & NEUROLOGY

## 2018-07-22 PROCEDURE — 80053 COMPREHEN METABOLIC PANEL: CPT | Performed by: EMERGENCY MEDICINE

## 2018-07-22 PROCEDURE — 80329 ANALGESICS NON-OPIOID 1 OR 2: CPT | Performed by: EMERGENCY MEDICINE

## 2018-07-22 PROCEDURE — 99285 EMERGENCY DEPT VISIT HI MDM: CPT

## 2018-07-22 PROCEDURE — 93005 ELECTROCARDIOGRAM TRACING: CPT

## 2018-07-22 PROCEDURE — 80320 DRUG SCREEN QUANTALCOHOLS: CPT | Performed by: EMERGENCY MEDICINE

## 2018-07-22 PROCEDURE — 85027 COMPLETE CBC AUTOMATED: CPT | Performed by: INTERNAL MEDICINE

## 2018-07-22 RX ORDER — LORAZEPAM 0.5 MG/1
0.5 TABLET ORAL EVERY 8 HOURS PRN
Status: DISCONTINUED | OUTPATIENT
Start: 2018-07-22 | End: 2018-07-22 | Stop reason: HOSPADM

## 2018-07-22 RX ORDER — NICOTINE 21 MG/24HR
1 PATCH, TRANSDERMAL 24 HOURS TRANSDERMAL DAILY
Status: DISCONTINUED | OUTPATIENT
Start: 2018-07-22 | End: 2018-07-22 | Stop reason: HOSPADM

## 2018-07-22 RX ORDER — SODIUM CHLORIDE 9 MG/ML
125 INJECTION, SOLUTION INTRAVENOUS CONTINUOUS
Status: DISCONTINUED | OUTPATIENT
Start: 2018-07-22 | End: 2018-07-22

## 2018-07-22 RX ORDER — MAGNESIUM SULFATE HEPTAHYDRATE 40 MG/ML
2 INJECTION, SOLUTION INTRAVENOUS ONCE
Status: COMPLETED | OUTPATIENT
Start: 2018-07-22 | End: 2018-07-22

## 2018-07-22 RX ORDER — POTASSIUM CHLORIDE 20 MEQ/1
40 TABLET, EXTENDED RELEASE ORAL ONCE
Status: COMPLETED | OUTPATIENT
Start: 2018-07-22 | End: 2018-07-22

## 2018-07-22 RX ADMIN — POTASSIUM CHLORIDE 40 MEQ: 1500 TABLET, EXTENDED RELEASE ORAL at 14:03

## 2018-07-22 RX ADMIN — SODIUM CHLORIDE 1000 ML: 0.9 INJECTION, SOLUTION INTRAVENOUS at 02:15

## 2018-07-22 RX ADMIN — MAGNESIUM SULFATE HEPTAHYDRATE 2 G: 40 INJECTION, SOLUTION INTRAVENOUS at 01:27

## 2018-07-22 RX ADMIN — SODIUM CHLORIDE 125 ML/HR: 0.9 INJECTION, SOLUTION INTRAVENOUS at 09:26

## 2018-07-22 RX ADMIN — SODIUM BICARBONATE 25 MEQ: 84 INJECTION, SOLUTION INTRAVENOUS at 03:02

## 2018-07-22 RX ADMIN — SODIUM CHLORIDE 1000 ML: 0.9 INJECTION, SOLUTION INTRAVENOUS at 01:19

## 2018-07-22 RX ADMIN — FOLIC ACID: 5 INJECTION, SOLUTION INTRAMUSCULAR; INTRAVENOUS; SUBCUTANEOUS at 02:48

## 2018-07-22 RX ADMIN — SODIUM CHLORIDE 1000 ML: 0.9 INJECTION, SOLUTION INTRAVENOUS at 00:54

## 2018-07-22 RX ADMIN — SODIUM CHLORIDE 1000 ML: 0.9 INJECTION, SOLUTION INTRAVENOUS at 01:48

## 2018-07-22 RX ADMIN — NICOTINE 1 PATCH: 21 PATCH, EXTENDED RELEASE TRANSDERMAL at 09:27

## 2018-07-22 RX ADMIN — SODIUM CHLORIDE 1000 ML: 0.9 INJECTION, SOLUTION INTRAVENOUS at 05:45

## 2018-07-22 NOTE — ED NOTES
Pt incontinent of urine; linens changed, pt washed and repositioned  Pt assisted with sitting up in bed briefly; also resisted us washing her         Cara Gowers, RN  07/22/18 9034
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
## 2018-07-22 NOTE — CASE MANAGEMENT
Initial Clinical Review    Admission: Date/Time/Statement: 7/22/18 @ 0216     Orders Placed This Encounter   Procedures    Inpatient Admission (expected length of stay for this patient is greater than two midnights)     Standing Status:   Standing     Number of Occurrences:   1     Order Specific Question:   Admitting Physician     Answer:   Brenden Mendiola [[id number]]     Order Specific Question:   Level of Care     Answer:   Level 1 Stepdown [13]     Order Specific Question:   Estimated length of stay     Answer:   More than 2 Midnights     Order Specific Question:   Certification     Answer:   I certify that inpatient services are medically necessary for this patient for a duration of greater than two midnights  See H&P and MD Progress Notes for additional information about the patient's course of treatment  ED: Date/Time/Mode of Arrival:   ED Arrival Information     Expected Arrival Acuity Means of Arrival Escorted By Service Admission Type    - 7/21/2018 23:39 Emergent Ambulance [facility] Complaint    OVERDOSE          Chief Complaint:   Chief Complaint   Patient presents with    Overdose - Intentional     Brought in after police got a call that pt was "unresponsive" at [facility]; reported that she drank vodka and that duloxetine  Pt arrives uncooperative and not giving any information  History of Illness: 32 y o  female with no significant past medical history that was brought in to the ER by police after they were called the patient was unresponsive at [facility]  Patient was said to have been found by past and the unconscious on a bench  When police arrived she was lying on the ground confuse and was noted to be intoxicated  She was found with an empty bottle of duloxetine any as well as a bottle of vodka  When she arrived in the ER she was said to be combative and she received Ativan as well as Haldol    Her blood pressure had been low and she had received intermittent boluses of IV fluids  Patient unable to provide me any information as she is very sedated though just few minutes ago she stated she wants to be left alone      Her urine toxicology positive for cocaine  Her alcohol level is 366  Her boyfriend has also been just admitted this evening by myself for changes in mental status and etoh intoxication  ED Vital Signs:   ED Triage Vitals   Temperature Pulse Respirations Blood Pressure SpO2   07/22/18 0722 07/22/18 0012 07/22/18 0012 07/22/18 0012 07/22/18 0012   (!) 96 9 °F (36 1 °C) 72 16 108/57 98 %      Temp Source Heart Rate Source Patient Position - Orthostatic VS BP Location FiO2 (%)   07/22/18 0722 07/22/18 0012 07/22/18 0119 07/22/18 0119 --   Tympanic Monitor Lying Left arm       Pain Score       07/22/18 0345       No Pain        Wt Readings from Last 1 Encounters:   07/22/18 47 2 kg (104 lb 0 9 oz)     O2 3L N/C    Vital Signs (abnormal):   Date/Time  Temp  Pulse  Resp  BP  SpO2  O2 Device  Patient Position - Orthostatic VS   07/22/18 0840  97 9 °F (36 6 °C)  80  18   85/59  99 %  --  --   07/22/18 0815  --  77  18   89/54  100 %  --  --   07/22/18 0800  --  77  20  93/50  100 %  Nasal cannula       07/22/18 0530  --  75  13   88/50  100 %  --  --   07/22/18 0515  --  91  13   68/53  99 %           Abnormal Labs:    07/22/18 0653    Sodium 136 - 145 mmol/L 149     Potassium 3 5 - 5 3 mmol/L 3 7    Comment: 18Slightly Hemolyzed;  Results May be Affected   Chloride 100 - 108 mmol/L 115     CO2 21 - 32 mmol/L 26    Anion Gap 4 - 13 mmol/L 8    BUN 5 - 25 mg/dL 2     Creatinine 0 60 - 1 30 mg/dL 0 39        07/22/18 0004    Ethanol Lvl 0 - 3 mg/dL 366       Cocaine Urine Negative Positive         Diagnostic Test Results: No order    ED Treatment:   Medication Administration from 07/21/2018 2339 to 07/22/2018 0839       Date/Time Order Dose Route Action     07/21/2018 2347 LORazepam (ATIVAN) 2 mg/mL injection 2 mg 2 mg Intramuscular Given     07/21/2018 2356 haloperidol lactate (HALDOL) injection 5 mg 5 mg Intramuscular Given     07/22/2018 0054 sodium chloride 0 9 % bolus 1,000 mL 1,000 mL Intravenous New Bag     07/22/2018 0127 magnesium sulfate 2 g/50 mL IVPB (premix) 2 g 2 g Intravenous New Bag     32/14/8022 6447 folic acid 1 mg, thiamine (VITAMIN B1) 100 mg in sodium chloride 0 9 % 50 mL IVPB   Intravenous New Bag     07/22/2018 0119 sodium chloride 0 9 % bolus 1,000 mL 1,000 mL Intravenous New Bag     07/22/2018 0148 sodium chloride 0 9 % bolus 1,000 mL 1,000 mL Intravenous New Bag     07/22/2018 0215 sodium chloride 0 9 % bolus 1,000 mL 1,000 mL Intravenous New Bag     07/22/2018 0302 sodium bicarbonate 8 4 % injection 25 mEq 25 mEq Intravenous Given     07/22/2018 0545 sodium chloride 0 9 % bolus 1,000 mL 1,000 mL Intravenous New Bag          Past Medical/Surgical History: Active Ambulatory Problems     Diagnosis Date Noted    No Active Ambulatory Problems     Resolved Ambulatory Problems     Diagnosis Date Noted    No Resolved Ambulatory Problems     Past Medical History:   Diagnosis Date    Heart murmur        Admitting Diagnosis: Dehydration [E86 0]  Hypovolemic shock ([facility] [address] ) [R57 1]  Overdose [T50 901A]  Acute alcohol intoxication ([facility] [address] ) [F10 929]  Prolonged QT interval [R94 31]  Polysubstance abuse [F19 10]    Age/Sex: 32 y o  female    Assessment/Plan:   Altered mental status- Secondary to polysubstance abuse and drug overdose  Unclear intent if suicidal as found with empty bottle of duloxetine  She also is intoxicated with alcohol  I suspect she may also be taking another illicit drug that cannot be detected through basic drug panel  I will continue with supportive measures  She is able to maintain her airway for now       Hypotension- could be medication induced vs volume depletion  Continue with IV hydration  She has got about 4L so far  No obvious clinical signs to suggestive infection   Will obtain a cortisol level      Prolonged QTc- Noted on repeated EKG after administration of haldol  Will try to minimize use  Had receieved magnesium and bicarbonate in the ED  Keep on telemetry       ETOH abuse- Will place on thiamine and folic acid  IV ativan as needed     Tobacco abuse- Place on nicotine patch       Admission Orders:  Tele monitoring  Continual Observation  Restraints  Psychiatry cons    Scheduled Meds:   Current Facility-Administered Medications:  nicotine 1 patch Transdermal Daily   sodium chloride 125 mL/hr Intravenous Continuous     Continuous Infusions:   sodium chloride 125 mL/hr Last Rate: 125 mL/hr (07/22/18 0926)     PRN Meds:     ------------------------------------------------------------------------------------------------------------------------------------    7/22 Psych cons:  Assessment:  Alcohol intoxication  Alcohol withdrawal  Rule out unspecified depression     Plan:   Risks, benefits and possible side effects of Medications:   Risks, benefits, and possible side effects of medications explained to patient and patient verbalizes understanding  patient needs documented alcohol level below the legal limit which in the [facility] is below 80ng  dl before she could be considered appropriate for any DC AMA   Alcohol Level ordered for now and in the AM

## 2018-07-22 NOTE — CONSULTS
Consultation - [facility] Briseyda 32 y o  female MRN: [id number]  Unit/Bed#: -01 Encounter: [id number]    Assessment/Plan     Assessment:  Alcohol intoxication  Alcohol withdrawal  Rule out unspecified depression    Plan:   Risks, benefits and possible side effects of Medications:   Risks, benefits, and possible side effects of medications explained to patient and patient verbalizes understanding  patient needs documented alcohol level below the legal limit which in the [facility] is below 80ng  dl before she could be considered appropriate for any DC AMA  Alcohol Level ordered for now and in the AM     Chief Complaint: " I didn't try to #($*& kill myself"    History of Present Illness   Physician Requesting Consult: Naila Fine MD  Reason for Consult / Principal Problem:  Rule out suicidal ideation    Patient is a 32 y o  female presents with Signs of suicidal potential    BELOW IS A COPY OF THE PRIMARY TREATMENT TEAM HPI  HPI:  Neel Leon is a 32 y o  female with no significant past medical history that was brought in to the ER by police after they were called the patient was unresponsive at [facility]  Patient was said to have been found by past and the unconscious on a bench  When police arrived she was lying on the ground confuse and was noted to be intoxicated  She was found with an empty bottle of duloxetine any as well as a bottle of vodka  When she arrived in the ER she was said to be combative and she received Ativan as well as Haldol  Her blood pressure had been low and she had received intermittent boluses of IV fluids  Patient unable to provide me any information as she is very sedated though just few minutes ago she stated she wants to be left alone      Her urine toxicology positive for cocaine  Her alcohol level is 366  Her boyfriend has also been just admitted this evening by myself for changes in mental status and etoh intoxication  END OF COPY    Patient not talkive to me aside from telling me she isn't suicidal and puts her blamket over her head       Consults    Psychiatric Review Of Systems:  Denies noelle or anhedonia  Says she is not hopeleiss    Historical Information      Past Psychiatric History:   She reports she was just discharged a few days ago from [facility] and does not want to go there ever again      Substance Abuse History:  She demies substance use although she had cocaine in her urine "i don't know how it got there   "  She most likely is more than a casual drinker if she has alcohol level over 360 when she came to the ED    Family Psychiatric History:   Patient declined to answer    Social History  She says she is unemployed and lives with her mother    Traumatic History:   deferred    Past Medical History:   Diagnosis Date    Heart murmur        Medical Review Of Systems:  Review of Systems    Meds/Allergies   all current active meds have been reviewed  Allergies   Allergen Reactions    Latex Rash       Objective   Vital signs in last 24 hours:  Temp:  [96 9 °F (36 1 °C)-97 9 °F (36 6 °C)] 97 7 °F (36 5 °C)  HR:  [63-95] 80  Resp:  [13-20] 18  BP: ()/(37-61) 85/59      Intake/Output Summary (Last 24 hours) at 07/22/18 1153  Last data filed at 07/22/18 3569   Gross per 24 hour   Intake             3290 ml   Output              700 ml   Net             2590 ml       Mental Status Evaluation:  Appearance:  disheveled   Behavior:  restless and fidgety   Speech:  pressured   Mood:  irritable   Affect:  mood-congruent   Language: jocular   Thought Process:  disorganized   Thought Content:  normal   Perceptual Disturbances: None   Risk Potential: she denies at this time   Sensorium:  person   Cognition:  impaired due to alcohol intocixation   Consciousness:  sedated    Attention: Skewed to alcohol intoxication   Intellect: decreased   Fund of Knowledge: Affected by level of alcohol intoxication   Insight:  impaired due to alcohol   Judgment: impaired due to alcohol   Muscle Strength and Tone: within normal limits   Gait/Station: she is in bed so this is not assessed   Motor Activity: no abnormal movements     Lab Results:Results for Yolanda Rod (MRN [id number]) as of 7/22/2018 11:53   Ref   Range 7/22/2018 00:04 7/22/2018 01:48 7/22/2018 06:53   eGFR Latest Units: ml/min/1 73sq m 131  142   Sodium Latest Ref Range: 136 - 145 mmol/L 147 (H)  149 (H)   Potassium Latest Ref Range: 3 5 - 5 3 mmol/L 4 0  3 7   Chloride Latest Ref Range: 100 - 108 mmol/L 109 (H)  115 (H)   CO2 Latest Ref Range: 21 - 32 mmol/L 26  26   Anion Gap Latest Ref Range: 4 - 13 mmol/L 12  8   BUN Latest Ref Range: 5 - 25 mg/dL 4 (L)  2 (L)   Creatinine Latest Ref Range: 0 60 - 1 30 mg/dL 0 50 (L)  0 39 (L)   Glucose Latest Ref Range: 65 - 140 mg/dL 102  88   Calcium Latest Ref Range: 8 3 - 10 1 mg/dL 8 2 (L)  6 1 (L)   AST Latest Ref Range: 5 - 45 U/L 67 (H)     ALT Latest Ref Range: 12 - 78 U/L 48     Alkaline Phosphatase Latest Ref Range: 46 - 116 U/L 167 (H)     Total Protein Latest Ref Range: 6 4 - 8 2 g/dL 7 1     Albumin Latest Ref Range: 3 5 - 5 0 g/dL 3 7     Total Bilirubin Latest Ref Range: 0 20 - 1 00 mg/dL 0 30     WBC Latest Ref Range: 4 31 - 10 16 Thousand/uL 5 31  5 97   RBC Latest Ref Range: 3 81 - 5 12 Million/uL 4 71  3 96   Hemoglobin Latest Ref Range: 11 5 - 15 4 g/dL 14 0  11 8   Hematocrit Latest Ref Range: 34 8 - 46 1 % 41 9  35 8   MCV Latest Ref Range: 82 - 98 fL 89  90   MCH Latest Ref Range: 26 8 - 34 3 pg 29 7  29 8   MCHC Latest Ref Range: 31 4 - 37 4 g/dL 33 4  33 0   RDW Latest Ref Range: 11 6 - 15 1 % 13 9  14 3   Platelets Latest Ref Range: 149 - 390 Thousands/uL 231  74 (L)   MPV Latest Ref Range: 8 9 - 12 7 fL 10 1  11 7   nRBC Latest Units: /100 WBCs 0  0   Neutrophils Relative Latest Ref Range: 43 - 75 % 57  78 (H)   Lymphocytes Relative Latest Ref Range: 14 - 44 % 28  9 (L)   Monocytes Relative Latest Ref Range: 4 - 12 % 11  10   Eosinophils Relative Latest Ref Range: 0 - 6 % 3  2   Basophils Relative Latest Ref Range: 0 - 1 % 1  0   Neutrophils Absolute Latest Ref Range: 1 85 - 7 62 Thousands/µL 3 02  4 74   Lymphocytes Absolute Latest Ref Range: 0 60 - 4 47 Thousands/µL 1 49  0 51 (L)   Monocytes Absolute Latest Ref Range: 0 17 - 1 22 Thousand/µL 0 58  0 58   Eosinophils Absolute Latest Ref Range: 0 00 - 0 61 Thousand/µL 0 15  0 09   Basophils Absolute Latest Ref Range: 0 00 - 0 10 Thousands/µL 0 05  0 02   ACETAMINOPHEN LEVEL Latest Ref Range: 10 - 30 ug/mL <2 (L)     SALICYLATE LEVEL Latest Ref Range: 3 - 20 mg/dL <3 (L)     TSH 3RD GENERATON Latest Ref Range: 0 358 - 3 740 uIU/mL   1 494   MEDICAL ALCOHOL Latest Ref Range: 0 - 3 mg/dL 366 (H)     AMPH/METH Latest Ref Range: Negative   Negative    BARBITURATE URINE Latest Ref Range: Negative   Negative    BENZODIAZEPINE URINE Latest Ref Range: Negative   Negative    THC URINE Latest Ref Range: Negative   Negative    COCAINE URINE Latest Ref Range: Negative   Positive (A)    METHADONE URINE Latest Ref Range: Negative   Negative    OPIATE URINE Latest Ref Range: Negative   Negative    PHENCYCLIDINE URINE Latest Ref Range: Negative   Negative    EXT PREG TEST UR (Ref: Negative) Unknown  negative    Immature Grans Absolute Latest Ref Range: 0 00 - 0 20 Thousand/uL 0 02  0 03   Immat GRANS % Latest Ref Range: 0 - 2 % 0  1     I    Code Status: Level 1 - Full Code

## 2018-07-22 NOTE — ED NOTES
SBP 60's, pt arousable with deep stimulation, mumbles words, not opening eyes, eyelids mildly puffy now, Dr Katie Mariee at bedside, call to Dr Sofya Crane to inform of pt's BP and need for admission orders  2nd IV site started rt [facility]       Denice Sifuentes RN  07/22/18 2724

## 2018-07-22 NOTE — ED NOTES
Attempted to give report to CLARI Shepherd on ICU, RN to call back    Jesika Pike doing 1:1     Mercedez Gross RN  07/22/18 291 Irma Portillo RN  07/22/18 1511

## 2018-07-22 NOTE — ED NOTES
Verified sodium chloride order, 125 ml/hr, with Dr Pauline Oliva, per Doctor's verbal order holding infusion        Karen Zuleta, CLARI  07/22/18 5680

## 2018-07-22 NOTE — ED NOTES
Incontinent urine; linens changed and pt cleansed; pt awakened and sat up and told us to stop moving her       eLonora Hernandez RN  07/22/18 5797

## 2018-07-22 NOTE — ED NOTES
SBP 68; pt sleeping; when we move pt and awaken pt her BP improves  With stimulation and movement, pt then yelled at us to stop moving her  Dr Selvin Baumann present in room; liter of NSS hung and infusing  Dr Paolo Romero called and intormed her of pt's BP and asked her to please come down to write admit orders       Gricel Good RN  07/22/18 7627

## 2018-07-22 NOTE — ED NOTES
Hospital Supervisor sent denton Westtimothy Hodgkins from floor to do 1:1 on patient  Patient has ICU bed       Krishna Huynh RN  07/22/18 0196

## 2018-07-22 NOTE — PROGRESS NOTES
Belongings checked in ER, by [employer],   Belongings are full of feces  Red Bagged and sent to the Shed

## 2018-07-22 NOTE — ED PROVIDER NOTES
History  Chief Complaint   Patient presents with    Overdose - Intentional     Brought in after police got a call that pt was "unresponsive" at [facility]; reported that she drank vodka and that duloxetine  Pt arrives uncooperative and not giving any information  32year old female brought in by police after a bystander found her unconscious on a bench in front of [facility]  When police arrived, she was lying on the ground, conscious, but confused and appeared intoxicated  Patient refuses to answer any questions  Police brought an empty bottle of duloxetine which was found next to her with a bottle of vodka  Unclear if patient took anything as she refuses to provide any history  History provided by:  Police  History limited by:  Mental status change (intoxication)  Overdose - Intentional   Ingested substance:  Alcohol and prescription medication (possible duloxetine ingestion)  Time since incident:  1 hour (approximate)  Ingestion amount:  Unknown  Witnesses present: no    Called poison control: no    Incident location: outside [employer]  Context: substance was missing and unsupervised    Associated symptoms: agitation    Risk factors comment:  Prior ED visit with alcohol intoxication      None       Past Medical History:   Diagnosis Date    Heart murmur        Past Surgical History:   Procedure Laterality Date    CARDIAC SURGERY         History reviewed  No pertinent family history  I have reviewed and agree with the history as documented  Social History   Substance Use Topics    Smoking status: Current Every Day Smoker     Types: Cigarettes    Smokeless tobacco: Never Used    Alcohol use Yes      Comment: Pt states she "drinks a lot "        Review of Systems   Unable to perform ROS: Mental status change (intoxication)   Psychiatric/Behavioral: Positive for agitation  Physical Exam  Physical Exam   Constitutional: She appears well-developed and well-nourished  Non-toxic appearance  No distress  HENT:   Head: Normocephalic and atraumatic  Eyes: Conjunctivae and EOM are normal  Pupils are equal, round, and reactive to light  Neck: Normal range of motion  Neck supple  No tracheal deviation present  No thyromegaly present  Cardiovascular: Normal rate, regular rhythm, normal heart sounds and intact distal pulses  Pulmonary/Chest: Effort normal and breath sounds normal    Abdominal: Soft  Bowel sounds are normal  She exhibits no distension  There is no tenderness  Lymphadenopathy:     She has no cervical adenopathy  Neurological: She is alert  Skin: Skin is warm and dry  She is not diaphoretic  Psychiatric: Her affect is angry  Her speech is slurred  She is agitated and combative  She expresses impulsivity  Nursing note and vitals reviewed        Vital Signs  ED Triage Vitals   Temp Pulse Respirations Blood Pressure SpO2   -- 07/22/18 0012 07/22/18 0012 07/22/18 0012 07/22/18 0012    72 16 108/57 98 %      Temp src Heart Rate Source Patient Position - Orthostatic VS BP Location FiO2 (%)   -- 07/22/18 0012 07/22/18 0119 07/22/18 0119 --    Monitor Lying Left arm       Pain Score       07/22/18 0345       No Pain           Vitals:    07/22/18 0400 07/22/18 0410 07/22/18 0415 07/22/18 0430   BP: (!) 92/45 91/52 92/51    Pulse: 74 75 69 68   Patient Position - Orthostatic VS:  Lying Lying        Visual Acuity  Visual Acuity      Most Recent Value   L Pupil Size (mm)  2   R Pupil Size (mm)  2          ED Medications  Medications   LORazepam (ATIVAN) 2 mg/mL injection 2 mg (2 mg Intramuscular Given 7/21/18 2347)   haloperidol lactate (HALDOL) injection 5 mg (5 mg Intramuscular Given 7/21/18 2356)   sodium chloride 0 9 % bolus 1,000 mL (0 mL Intravenous Stopped 7/22/18 0210)   magnesium sulfate 2 g/50 mL IVPB (premix) 2 g (0 g Intravenous Stopped 2/29/84 1794)   folic acid 1 mg, thiamine (VITAMIN B1) 100 mg in sodium chloride 0 9 % 50 mL IVPB ( Intravenous Stopped 7/22/18 0312)   sodium chloride 0 9 % bolus 1,000 mL (0 mL Intravenous Stopped 7/22/18 0147)   sodium chloride 0 9 % bolus 1,000 mL (0 mL Intravenous Stopped 7/22/18 0249)   sodium chloride 0 9 % bolus 1,000 mL (0 mL Intravenous Stopped 7/22/18 0315)   sodium bicarbonate 8 4 % injection 25 mEq (25 mEq Intravenous Given 7/22/18 0302)       Diagnostic Studies  Results Reviewed     Procedure Component Value Units Date/Time    Rapid drug screen, urine [[id number]]  (Abnormal) Collected:  07/22/18 0148    Lab Status:  Final result Specimen:  Urine from Urine, Catheter Updated:  07/22/18 0211     Amph/Meth UR Negative     Barbiturate Ur Negative     Benzodiazepine Urine Negative     Cocaine Urine Positive (A)     Methadone Urine Negative     Opiate Urine Negative     PCP Ur Negative     THC Urine Negative    Narrative:         Presumptive report  If requested, specimen will be sent to reference lab for confirmation  FOR MEDICAL PURPOSES ONLY  IF CONFIRMATION NEEDED PLEASE CONTACT THE LAB WITHIN 5 DAYS      Drug Screen Cutoff Levels:  AMPHETAMINE/METHAMPHETAMINES  1000 ng/mL  BARBITURATES     200 ng/mL  BENZODIAZEPINES     200 ng/mL  COCAINE      300 ng/mL  METHADONE      300 ng/mL  OPIATES      300 ng/mL  PHENCYCLIDINE     25 ng/mL  THC       50 ng/mL    POCT pregnancy, urine [[id number]]  (Normal) Resulted:  07/22/18 0148    Lab Status:  Final result Updated:  07/22/18 0148     EXT PREG TEST UR (Ref: Negative) negative    Comprehensive metabolic panel [[id number]]  (Abnormal) Collected:  07/22/18 0004    Lab Status:  Final result Specimen:  Blood from Arm, Left Updated:  07/22/18 0109     Sodium 147 (H) mmol/L      Potassium 4 0 mmol/L      Chloride 109 (H) mmol/L      CO2 26 mmol/L      Anion Gap 12 mmol/L      BUN 4 (L) mg/dL      Creatinine 0 50 (L) mg/dL      Glucose 102 mg/dL      Calcium 8 2 (L) mg/dL      AST 67 (H) U/L      ALT 48 U/L      Alkaline Phosphatase 167 (H) U/L      Total Protein 7 1 g/dL      Albumin 3 7 g/dL      Total Bilirubin 0 30 mg/dL eGFR 131 ml/min/1 73sq m     Narrative:         National Kidney Disease Education Program recommendations are as follows:  GFR calculation is accurate only with a steady state creatinine  Chronic Kidney disease less than 60 ml/min/1 73 sq  meters  Kidney failure less than 15 ml/min/1 73 sq  meters      Salicylate level [[id number]]  (Abnormal) Collected:  07/22/18 0004    Lab Status:  Final result Specimen:  Blood from Arm, Left Updated:  19/80/05 0785     Salicylate Lvl <3 (L) mg/dL     Acetaminophen level [[id number]]  (Abnormal) Collected:  07/22/18 0004    Lab Status:  Final result Specimen:  Blood from Arm, Left Updated:  07/22/18 0109     Acetaminophen Level <2 (L) ug/mL     Ethanol [[id number]]  (Abnormal) Collected:  07/22/18 0004    Lab Status:  Final result Specimen:  Blood from Arm, Left Updated:  07/22/18 0053     Ethanol Lvl 366 (H) mg/dL     CBC and differential [[id number]] Collected:  07/22/18 0004    Lab Status:  Final result Specimen:  Blood from Arm, Left Updated:  07/22/18 0028     WBC 5 31 Thousand/uL      RBC 4 71 Million/uL      Hemoglobin 14 0 g/dL      Hematocrit 41 9 %      MCV 89 fL      MCH 29 7 pg      MCHC 33 4 g/dL      RDW 13 9 %      MPV 10 1 fL      Platelets 517 Thousands/uL      nRBC 0 /100 WBCs      Neutrophils Relative 57 %      Immat GRANS % 0 %      Lymphocytes Relative 28 %      Monocytes Relative 11 %      Eosinophils Relative 3 %      Basophils Relative 1 %      Neutrophils Absolute 3 02 Thousands/µL      Immature Grans Absolute 0 02 Thousand/uL      Lymphocytes Absolute 1 49 Thousands/µL      Monocytes Absolute 0 58 Thousand/µL      Eosinophils Absolute 0 15 Thousand/µL      Basophils Absolute 0 05 Thousands/µL                  No orders to display              Procedures  ECG 12 Lead Documentation  Date/Time: 7/22/2018 12:05 AM  Performed by: Alonso Bonilla  Authorized by: Alonso Bonilla     Indications / Diagnosis:  Intoxication, potential ingestion  Patient location: ED  Previous ECG:     Previous ECG:  Unavailable  Interpretation:     Interpretation: abnormal    Rate:     ECG rate:  63    ECG rate assessment: normal    Rhythm:     Rhythm: sinus rhythm    Ectopy:     Ectopy: none    QRS:     QRS axis:  Normal    QRS intervals: Wide  Conduction:     Conduction: abnormal      Abnormal conduction: complete RBBB    ST segments:     ST segments:  Normal  T waves:     T waves: inverted      Inverted:  V2  ECG 12 Lead Documentation  Date/Time: 7/22/2018 1:00 AM  Performed by: Shea Christiansen  Authorized by: Shea Christiansen     Indications / Diagnosis:  Repeat  ECG reviewed by me, the ED Provider: yes    Patient location:  ED  Previous ECG:     Previous ECG:  Compared to current    Comparison ECG info:  00:05 today, normal sinus rhythm with RBBB    Similarity:  Changes noted  Interpretation:     Interpretation: abnormal    Rate:     ECG rate:  63    ECG rate assessment: normal    Rhythm:     Rhythm: sinus rhythm    Ectopy:     Ectopy: none    QRS:     QRS axis:  Normal    QRS intervals:   Wide  Conduction:     Conduction: abnormal      Abnormal conduction: complete RBBB    ST segments:     ST segments:  Normal  T waves:     T waves: inverted      Inverted:  V2  Other findings:     Other findings: prolonged qTc interval    CriticalCare Time  Performed by: Shea Christiansen  Authorized by: Shea Christiansen     Critical care provider statement:     Critical care time (minutes):  40    Critical care start time:  7/22/2018 12:30 AM    Critical care end time:  7/22/2018 4:53 AM    Critical care time was exclusive of:  Separately billable procedures and treating other patients and teaching time    Critical care was necessary to treat or prevent imminent or life-threatening deterioration of the following conditions:  Dehydration and shock    Critical care was time spent personally by me on the following activities:  Evaluation of patient's response to treatment, examination of patient, re-evaluation of patient's condition and ordering and performing treatments and interventions           Phone Contacts  ED Phone Contact    ED Course  ED Course as of Jul 22 0457   Sun Jul 22, 2018   0056 MEDICAL ALCOHOL: (!) 366   0212 COCAINE URINE: (!) Positive                               MDM  Number of Diagnoses or Management Options  Acute alcohol intoxication ([facility] [address] ): new and requires workup  Dehydration: new and requires workup  Hypovolemic shock ([facility] [address] ): new and requires workup  Polysubstance abuse: new and requires workup  Prolonged QT interval: new and requires workup  Diagnosis management comments: 32year old female brought in by police for severe intoxication with possible duloxetine ingestion  Patient combative, moving all 4 extremities with normal strength on arrival  Sedated with haldol and ativan  Following haldol administration, patient developed a prolonged QTc interval  Magnesium and bicarb given  Patient also hypotensive  Improvement with IV fluid boluses  Patient appears dehydrated on exam  Likely volume depleted  Improvement with total of 4 L NS  Patient re-evaluated multiple times with recurring hypotension with repeated fluid boluses  Critical care time of approximately 40 minutes  Patient admitted to stepdown unit for close monitoring  Amount and/or Complexity of Data Reviewed  Clinical lab tests: ordered and reviewed  Obtain history from someone other than the patient: yes  Review and summarize past medical records: yes    Patient Progress  Patient progress: stable    The patient presented with a condition in which there was a high probability of imminent or life-threatening deterioration, and critical care services (excluding separately billable procedures) totalled 30-74 minutes          Disposition  Final diagnoses:   Prolonged QT interval   Acute alcohol intoxication ([facility] [address] )   Polysubstance abuse   Dehydration   Hypovolemic shock ([facility] [address] )     Time reflects when diagnosis was documented in both MDM as applicable and the Disposition within this note     Time User Action Codes Description Comment    7/22/2018  1:01 AM Mini LowellVelia sykeson Add [R94 31] Prolonged QT interval     7/22/2018  1:04 AM Louetta Delmy Add [F10 929] Acute alcohol intoxication ([facility] [address] )     7/22/2018  1:04 AM Louetta Delmy Modify [R94 31] Prolonged QT interval     7/22/2018  1:04 AM Louetta Delmy Modify [F10 929] Acute alcohol intoxication ([facility] [address] )     7/22/2018  2:14 AM Louetta Delmy Add [F19 10] Polysubstance abuse     7/22/2018  4:49 AM Mini Lowell, Velia Valenzuelaon Add [I95 9] Hypotension     7/22/2018  4:49 AM Velia Richardsonon Add [E86 0] Dehydration     7/22/2018  4:56 AM SusanaVelia treviñoon Add [R57 1] Hypovolemic shock ([facility] [address] )     7/22/2018  4:56 AM SusanaVelia dewey Remove [I95 9] Hypotension       ED Disposition     ED Disposition Condition Comment    Admit  Case was discussed with JOHNNY and the patient's admission status was agreed to be Admission Status: inpatient status to the service of Dr Xi Ken   Follow-up Information    None         Patient's Medications    No medications on file     No discharge procedures on file      ED Provider  Electronically Signed by           Yajaira Bai MD  07/22/18 7741

## 2018-07-22 NOTE — H&P
H&P Exam - Bridget Mendoza 32 y o  female MRN: [id number]    Unit/Bed#: ED 03 Encounter: [id number]        History of Present Illness     HPI:  Bridget Mendoza is a 32 y o  female with no significant past medical history that was brought in to the ER by police after they were called the patient was unresponsive at [facility]  Patient was said to have been found by past and the unconscious on a bench  When police arrived she was lying on the ground confuse and was noted to be intoxicated  She was found with an empty bottle of duloxetine any as well as a bottle of vodka  When she arrived in the ER she was said to be combative and she received Ativan as well as Haldol  Her blood pressure had been low and she had received intermittent boluses of IV fluids  Patient unable to provide me any information as she is very sedated though just few minutes ago she stated she wants to be left alone  Her urine toxicology positive for cocaine  Her alcohol level is 366  Her boyfriend has also been just admitted this evening by myself for changes in mental status and etoh intoxication  Historical Information   Past Medical History:   Diagnosis Date    Heart murmur      There is no problem list on file for this patient  Past Surgical History:   Procedure Laterality Date    CARDIAC SURGERY         Social History   History   Alcohol Use    Yes     Comment: Pt states she "drinks a lot "     History   Drug Use No     History   Smoking Status    Current Every Day Smoker    Types: Cigarettes   Smokeless Tobacco    Never Used       Family History: History reviewed  No pertinent family history  Meds/Allergies       Current Facility-Administered Medications:     sodium chloride 0 9 % bolus 1,000 mL, 1,000 mL, Intravenous, Once, Saeed Bui MD, Last Rate: 1,000 mL/hr at 07/22/18 0545, 1,000 mL at 07/22/18 0545  No current outpatient prescriptions on file      Allergies   Allergen Reactions    Latex Review of Systems  Unable to obtain      Objective   Vitals: Blood pressure (!) 88/50, pulse 75, resp  rate 13, weight 47 2 kg (104 lb), SpO2 100 %  Physical Exam   General- Deeply sedated  Withdraws to pain and very mildly to verbal stimuli before she went back sleeping  HEENT: Pupils equal bilateral, sclera anicteric,  Unable to full examine the mucous membranes  Neck: supple, no JVD, lymphadenopathy, thyromegaly  Heart: Regular rate and rhythm, S1S2 present  No murmur, rub or gallop  Lungs; Clear to auscultation bilaterally  No wheezing, crackles or rhonchi  No accessory muscle use or respiratory distress  Abdomen: soft, non-tender, non-distended, NABS  No guarding or rebound  No peritoneal sound or mass  Extremities: Scattered small scabs on both legs bilaterally  no clubbing, cyanosis, or edema  2+ pedal pulses bilaterally  Full range of motion  Neurologic:  Deeply sedated  Skin: warm and dry  No petechiae, purpura and with scattered scratch marks and small scab lesions on the lower extremities rash  Lab Results:     Results from last 7 days  Lab Units 07/22/18  0004   WBC Thousand/uL 5 31   HEMOGLOBIN g/dL 14 0   HEMATOCRIT % 41 9   PLATELETS Thousands/uL 231       Results from last 7 days  Lab Units 07/22/18  0004   SODIUM mmol/L 147*   POTASSIUM mmol/L 4 0   CHLORIDE mmol/L 109*   CO2 mmol/L 26   BUN mg/dL 4*   CREATININE mg/dL 0 50*   GLUCOSE RANDOM mg/dL 102   CALCIUM mg/dL 8 2*         EKG, Pathology, and Other Studies:  NSR with prolong qtc of 548, RBBB    Assessment/Plan     Altered mental status- Secondary to polysubstance abuse and drug overdose  Unclear intent if suicidal as found with empty bottle of duloxetine  She also is intoxicated with alcohol  I suspect she may also be taking another illicit drug that cannot be detected through basic drug panel  I will continue with supportive measures  She is able to maintain her airway for now       Hypotension- could be medication induced vs volume depletion  Continue with IV hydration  She has got about 4L so far  No obvious clinical signs to suggestive infection  Will obtain a cortisol level  Prolonged QTc- Noted on repeated EKG after administration of haldol  Will try to minimize use  Had receieved magnesium and bicarbonate in the ED  Keep on telemetry  ETOH abuse- Will place on thiamine and folic acid  IV ativan as needed    Tobacco abuse- Place on nicotine patch      Code Status: No Order      Counseling / Coordination of Care  Total floor / unit time spent today 40 minutes  Greater than 50% of total time was spent with the patient and / or family counseling and / or coordination of care  Anticipate at least 2 midnight stay  Portions of the record may have been created with voice recognition software  Occasional wrong word or "sound a like" substitutions may have occurred due to the inherent limitations of voice recognition software  Read the chart carefully and recognize, using context, where substitutions have occurred

## 2018-07-22 NOTE — ED NOTES
SBP 78; Dr Beach Alu aware and pressure bag applied to IV fluids  Pt is sleeping; respirations are even and non-labored       Rambo Ortiz RN  07/22/18 0421

## 2018-07-22 NOTE — ED NOTES
ETCO2 monitor applied and results currently 40; Dr Selvin Baumann in room and aware       Gricel Good RN  07/22/18 1512

## 2018-07-22 NOTE — ED NOTES
Continues to be uncooperative; attempted to bite staff, kick; not answering questions; will not allow us to cover her nakedbody; medicated as ordered with haldol  4 point restraints applied       Checo Crimes, CLARI  07/22/18 2580

## 2018-07-24 LAB
ATRIAL RATE: 61 BPM
ATRIAL RATE: 63 BPM
EXTERNAL CHLAMYDIA RESULT: NEGATIVE
N GONORRHOEA RRNA SPEC QL PROBE: NEGATIVE
P AXIS: 52 DEGREES
P AXIS: 63 DEGREES
PR INTERVAL: 136 MS
PR INTERVAL: 136 MS
QRS AXIS: -17 DEGREES
QRS AXIS: 16 DEGREES
QRSD INTERVAL: 138 MS
QRSD INTERVAL: 148 MS
QT INTERVAL: 494 MS
QT INTERVAL: 536 MS
QTC INTERVAL: 497 MS
QTC INTERVAL: 548 MS
T WAVE AXIS: 69 DEGREES
T WAVE AXIS: 70 DEGREES
VENTRICULAR RATE: 61 BPM
VENTRICULAR RATE: 63 BPM

## 2018-07-24 PROCEDURE — 93010 ELECTROCARDIOGRAM REPORT: CPT | Performed by: INTERNAL MEDICINE

## 2018-08-11 LAB
EXTERNAL HIV CONFIRMATION: NORMAL
EXTERNAL HIV SCREEN: NORMAL

## 2019-03-21 ENCOUNTER — HOSPITAL ENCOUNTER (EMERGENCY)
Facility: HOSPITAL | Age: 29
End: 2019-03-22
Attending: EMERGENCY MEDICINE | Admitting: EMERGENCY MEDICINE
Payer: MEDICARE

## 2019-03-21 DIAGNOSIS — R45.851 SUICIDAL IDEATIONS: ICD-10-CM

## 2019-03-21 DIAGNOSIS — F32.A DEPRESSION: Primary | ICD-10-CM

## 2019-03-21 LAB
ALBUMIN SERPL BCP-MCNC: 3.2 G/DL (ref 3.5–5)
ALP SERPL-CCNC: 76 U/L (ref 46–116)
ALT SERPL W P-5'-P-CCNC: 33 U/L (ref 12–78)
AMPHETAMINES SERPL QL SCN: NEGATIVE
ANION GAP SERPL CALCULATED.3IONS-SCNC: 7 MMOL/L (ref 4–13)
AST SERPL W P-5'-P-CCNC: 26 U/L (ref 5–45)
BARBITURATES UR QL: NEGATIVE
BASOPHILS # BLD AUTO: 0.03 THOUSANDS/ΜL (ref 0–0.1)
BASOPHILS NFR BLD AUTO: 1 % (ref 0–1)
BENZODIAZ UR QL: NEGATIVE
BILIRUB SERPL-MCNC: 0.3 MG/DL (ref 0.2–1)
BUN SERPL-MCNC: 16 MG/DL (ref 5–25)
CALCIUM SERPL-MCNC: 8 MG/DL (ref 8.3–10.1)
CHLORIDE SERPL-SCNC: 109 MMOL/L (ref 100–108)
CO2 SERPL-SCNC: 28 MMOL/L (ref 21–32)
COCAINE UR QL: NEGATIVE
CREAT SERPL-MCNC: 0.7 MG/DL (ref 0.6–1.3)
EOSINOPHIL # BLD AUTO: 0.06 THOUSAND/ΜL (ref 0–0.61)
EOSINOPHIL NFR BLD AUTO: 1 % (ref 0–6)
ERYTHROCYTE [DISTWIDTH] IN BLOOD BY AUTOMATED COUNT: 12 % (ref 11.6–15.1)
ETHANOL EXG-MCNC: 0 MG/DL
EXT PREG TEST URINE: NORMAL
GFR SERPL CREATININE-BSD FRML MDRD: 117 ML/MIN/1.73SQ M
GLUCOSE SERPL-MCNC: 102 MG/DL (ref 65–140)
HCT VFR BLD AUTO: 36.3 % (ref 34.8–46.1)
HGB BLD-MCNC: 12.3 G/DL (ref 11.5–15.4)
IMM GRANULOCYTES # BLD AUTO: 0.02 THOUSAND/UL (ref 0–0.2)
IMM GRANULOCYTES NFR BLD AUTO: 0 % (ref 0–2)
LYMPHOCYTES # BLD AUTO: 1.32 THOUSANDS/ΜL (ref 0.6–4.47)
LYMPHOCYTES NFR BLD AUTO: 21 % (ref 14–44)
MCH RBC QN AUTO: 31.3 PG (ref 26.8–34.3)
MCHC RBC AUTO-ENTMCNC: 33.9 G/DL (ref 31.4–37.4)
MCV RBC AUTO: 92 FL (ref 82–98)
METHADONE UR QL: NEGATIVE
MONOCYTES # BLD AUTO: 0.72 THOUSAND/ΜL (ref 0.17–1.22)
MONOCYTES NFR BLD AUTO: 12 % (ref 4–12)
NEUTROPHILS # BLD AUTO: 4.07 THOUSANDS/ΜL (ref 1.85–7.62)
NEUTS SEG NFR BLD AUTO: 65 % (ref 43–75)
NRBC BLD AUTO-RTO: 0 /100 WBCS
OPIATES UR QL SCN: NEGATIVE
PCP UR QL: NEGATIVE
PLATELET # BLD AUTO: 214 THOUSANDS/UL (ref 149–390)
PMV BLD AUTO: 10.6 FL (ref 8.9–12.7)
POTASSIUM SERPL-SCNC: 3.9 MMOL/L (ref 3.5–5.3)
PROT SERPL-MCNC: 6 G/DL (ref 6.4–8.2)
RBC # BLD AUTO: 3.93 MILLION/UL (ref 3.81–5.12)
SODIUM SERPL-SCNC: 144 MMOL/L (ref 136–145)
THC UR QL: NEGATIVE
WBC # BLD AUTO: 6.22 THOUSAND/UL (ref 4.31–10.16)

## 2019-03-21 PROCEDURE — 99285 EMERGENCY DEPT VISIT HI MDM: CPT

## 2019-03-21 PROCEDURE — 81025 URINE PREGNANCY TEST: CPT | Performed by: EMERGENCY MEDICINE

## 2019-03-21 PROCEDURE — 85025 COMPLETE CBC W/AUTO DIFF WBC: CPT | Performed by: EMERGENCY MEDICINE

## 2019-03-21 PROCEDURE — 36415 COLL VENOUS BLD VENIPUNCTURE: CPT | Performed by: EMERGENCY MEDICINE

## 2019-03-21 PROCEDURE — 80307 DRUG TEST PRSMV CHEM ANLYZR: CPT | Performed by: EMERGENCY MEDICINE

## 2019-03-21 PROCEDURE — 80053 COMPREHEN METABOLIC PANEL: CPT | Performed by: EMERGENCY MEDICINE

## 2019-03-21 PROCEDURE — 82075 ASSAY OF BREATH ETHANOL: CPT | Performed by: EMERGENCY MEDICINE

## 2019-03-22 VITALS
HEART RATE: 84 BPM | SYSTOLIC BLOOD PRESSURE: 135 MMHG | TEMPERATURE: 97.4 F | OXYGEN SATURATION: 100 % | DIASTOLIC BLOOD PRESSURE: 76 MMHG | RESPIRATION RATE: 18 BRPM

## 2019-03-22 NOTE — ED NOTES
[facility] transport to [facility] rescheduled pickup for [address]   03/22/19 [address]  03/22/19 2475

## 2019-03-22 NOTE — ED NOTES
Insurance Authorization:   Phone call placed to [employer]  Phone number: [phone]     Spoke to Carlton Ramirez        4 days approved counting 3/21/19  Level of care: inpatient  Review on 3/25/19  Authorization # will be given to accepting facility upon admission  EVS (Eligibility Verification System) called - [phone]  Automated system indicates: Pt is eligible for MA through [facility] is medical provider, [employer] health care  Patient requested referral to [facility] or [facility] if possible  [place] reviewing referral at this time

## 2019-03-22 NOTE — ED NOTES
Received a call from Clarisse Avilez in admissions department at [facility]   Pt was accepted there by Dr Regulo Paz

## 2019-03-22 NOTE — ED NOTES
[facility] transport arranged for 3pm Friday, but EMTALA not completed in hopes earlier transport can be arranged on first shift with an alternate transport company  No other transport co was able to transport tonight or schedule for tomorrow per their report  Called No[facility] GARC[facility] and [place]  Spoke with Cas Dan at [facility] [employer] and provided 3pm pickup at [employer] as current scheduled time, but said in the morning first shift would attempt to secure something earlier and would notify them if successful

## 2019-03-22 NOTE — EMTALA/ACUTE CARE TRANSFER
[address]  [address] [facility] [place] [address]  Dept: [phone]      EMTALA TRANSFER CONSENT    NAME Gino Nice                                         1990                              MRN [id number]    I have been informed of my rights regarding examination, treatment, and transfer   by Dr Eddie Polanco DO    Benefits: Specialized equipment and/or services available at the receiving facility (Include comment)________________________    Risks: Potential for delay in receiving treatment, Potential deterioration of medical condition      Consent for Transfer:  I acknowledge that my medical condition has been evaluated and explained to me by the emergency department physician or other qualified medical person and/or my attending physician, who has recommended that I be transferred to the service of  Accepting Physician: Dr Calton Mcburney at [address] Name, [address] : Binu Marquez  The above potential benefits of such transfer, the potential risks associated with such transfer, and the probable risks of not being transferred have been explained to me, and I fully understand them  The doctor has explained that, in my case, the benefits of transfer outweigh the risks  I agree to be transferred  I authorize the performance of emergency medical procedures and treatments upon me in both transit and upon arrival at the receiving facility  Additionally, I authorize the release of any and all medical records to the receiving facility and request they be transported with me, if possible  I understand that the safest mode of transportation during a medical emergency is an ambulance and that the Hospital advocates the use of this mode of transport  Risks of traveling to the receiving facility by car, including absence of medical control, life sustaining equipment, such as oxygen, and medical personnel has been explained to me and I fully understand them      ([address] CORRECT [address]OX BELOW)  [  ]  I consent to the stated transfer and to be transported by ambulance/helicopter  [  ]  I consent to the stated transfer, but refuse transportation by ambulance and accept full responsibility for my transportation by car  I understand the risks of non-ambulance transfers and I exonerate the Hospital and its staff from any deterioration in my condition that results from this refusal     X___________________________________________    DATE  19  TIME________  Signature of patient or legally responsible individual signing on patient behalf           RELATIONSHIP TO PATIENT_________________________          Provider Certification    NAME Gino Nice                                         1990                              MRN [id number]    A medical screening exam was performed on the above named patient  Based on the examination:    Condition Necessitating Transfer The primary encounter diagnosis was Depression  A diagnosis of Suicidal ideations was also pertinent to this visit  Patient Condition: The patient has been stabilized such that within reasonable medical probability, no material deterioration of the patient condition or the condition of the unborn child(kayleigh) is likely to result from the transfer    Reason for Transfer: Level of Care needed not available at this facility    Transfer Requirements: [address]   · Space available and qualified personnel available for treatment as acknowledged by Crisis  · Agreed to accept transfer and to provide appropriate medical treatment as acknowledged by       Dr Calton Mcburney  · Appropriate medical records of the examination and treatment of the patient are provided at the time of transfer   [address], [address] _______  · Transfer will be performed by qualified personnel from [facility]  and appropriate transfer equipment as required, including the use of necessary and appropriate life support measures      Provider Certification: I have examined the patient and explained the following risks and benefits of being transferred/refusing transfer to the patient/family:  General risk, such as traffic hazards, adverse weather conditions, rough terrain or turbulence, possible failure of equipment (including vehicle or aircraft), or consequences of actions of persons outside the control of the transport personnel, Unanticipated needs of medical equipment and personnel during transport, Risk of worsening condition, The possibility of a transport vehicle being unavailable      Based on these reasonable risks and benefits to the patient and/or the unborn child(kayleigh), and based upon the information available at the time of the patients examination, I certify that the medical benefits reasonably to be expected from the provision of appropriate medical treatments at another medical facility outweigh the increasing risks, if any, to the individuals medical condition, and in the case of labor to the unborn child, from effecting the transfer      X____________________________________________ DATE 03/22/19        TIME_______      ORIGINAL - SEND TO MEDICAL RECORDS   COPY - SEND WITH PATIENT DURING TRANSFER

## 2019-03-22 NOTE — ED NOTES
Patient is reporting that she is feeling suicidal with thoughts of running into traffic  She says she has had some depression most of her life, but was able to deal with it without help or medications until her father passed away in 2017  After that she says she began abusing alcohol to try to avoid feeling so sad and hopeless  She reports having inpatient detox and dual treatment when she became suicidal at that time  She met her fiance a year or so ago, and her family disliked him, so she has nothing to do with them anymore  They have lived together for the past year, but are currently homeless  She reports that together they have had periods where they drank alcohol excessively, and she was in [place] last year for SI and was also drinking a great deal then as well  She reports being clean for 6 months, then beginning to drink with her fiance only occasionally at first, but then more and more  She stopped on her own recently, and reports consuming no alcohol for the past week  She is requesting inpatient mental health or inpatient dual treatment  She says she cannot stop thinking about her father and missing him, and she feels she just doesn't want or have any reason to be alive any longer  She denies HI and psychosis

## 2019-03-22 NOTE — ED PROVIDER NOTES
History  Chief Complaint   Patient presents with    Suicidal     Multiple stressors led to pt (and her boyfriend) to consider suicide as a solution to their problems  Homeless, no cash, out of resources  Planned to walk out into traffic  This is a 58-year-old female with a history of alcohol abuse and depression who presents via ambulance with her fiance complaining of suicidal intention to walk in front of a car  She states that she does drink about a half bottle of hard alcohol daily but has not had a drink in approximately 1 week she does not have a history of withdrawal seizures  She is currently homeless  History provided by:  Patient and EMS personnel  Psychiatric Evaluation   Presenting symptoms: suicidal thoughts    Degree of incapacity (severity): Unable to specify  Onset quality:  Unable to specify  Timing:  Constant  Progression:  Unchanged  Context: alcohol use        None       Past Medical History:   Diagnosis Date    Cocaine abuse ([facility] [address] )     Heart murmur     Psychiatric disorder     Previous psych admissions  Past Surgical History:   Procedure Laterality Date    CARDIAC SURGERY         No family history on file  I have reviewed and agree with the history as documented  Social History     Tobacco Use    Smoking status: Current Every Day Smoker     Types: Cigarettes    Smokeless tobacco: Never Used   Substance Use Topics    Alcohol use: Not Currently     Comment: Detox from alcohol   Drug use: No        Review of Systems   Psychiatric/Behavioral: Positive for suicidal ideas  All other systems reviewed and are negative  Physical Exam  Physical Exam   Constitutional: She is oriented to person, place, and time  She appears well-developed and well-nourished  No distress  HENT:   Head: Normocephalic and atraumatic     Right Ear: External ear normal    Left Ear: External ear normal    Nose: Nose normal    Mouth/Throat: Oropharynx is clear and moist    Eyes: Pupils are equal, round, and reactive to light  EOM are normal  Right eye exhibits no discharge  Left eye exhibits no discharge  No scleral icterus  Neck: Neck supple  No tracheal deviation present  Cardiovascular: Normal rate, regular rhythm and intact distal pulses  Exam reveals no gallop and no friction rub  No murmur heard  Pulmonary/Chest: Effort normal and breath sounds normal  No stridor  No respiratory distress  She has no wheezes  She has no rales  She exhibits no tenderness  Abdominal: Soft  Bowel sounds are normal  She exhibits no distension and no mass  There is no tenderness  There is no rebound and no guarding  Musculoskeletal: Normal range of motion  She exhibits no edema, tenderness or deformity  Neurological: She is alert and oriented to person, place, and time  She displays normal reflexes  No cranial nerve deficit or sensory deficit  She exhibits normal muscle tone  Skin: Skin is warm and dry  No rash noted  She is not diaphoretic  Psychiatric:   Flat affect with suicidal ideation to walk in front of a car currently homeless   Nursing note and vitals reviewed        Vital Signs  ED Triage Vitals   Temperature Pulse Respirations Blood Pressure SpO2   03/21/19 2027 03/21/19 2027 03/21/19 2027 03/21/19 2027 03/21/19 2027   98 °F (36 7 °C) 84 18 128/78 98 %      Temp Source Heart Rate Source Patient Position - Orthostatic VS BP Location FiO2 (%)   03/22/19 0421 -- 03/22/19 0421 -- --   Temporal  Lying        Pain Score       --                  Vitals:    03/21/19 2027 03/22/19 0421   BP: 128/78 107/59   Pulse: 84 62   Patient Position - Orthostatic VS:  Lying         Visual Acuity      ED Medications  Medications - No data to display    Diagnostic Studies  Results Reviewed     Procedure Component Value Units Date/Time    Comprehensive metabolic panel [[id number]]  (Abnormal) Collected:  03/21/19 2126    Lab Status:  Final result Specimen:  Blood from Arm, Left Updated:  03/21/19 2201     Sodium 144 mmol/L      Potassium 3 9 mmol/L      Chloride 109 mmol/L      CO2 28 mmol/L      ANION GAP 7 mmol/L      BUN 16 mg/dL      Creatinine 0 70 mg/dL      Glucose 102 mg/dL      Calcium 8 0 mg/dL      AST 26 U/L      ALT 33 U/L      Alkaline Phosphatase 76 U/L      Total Protein 6 0 g/dL      Albumin 3 2 g/dL      Total Bilirubin 0 30 mg/dL      eGFR 117 ml/min/1 73sq m     Narrative:       National Kidney Disease Education Program recommendations are as follows:  GFR calculation is accurate only with a steady state creatinine  Chronic Kidney disease less than 60 ml/min/1 73 sq  meters  Kidney failure less than 15 ml/min/1 73 sq  meters  CBC and differential [[id number]] Collected:  03/21/19 2126    Lab Status:  Final result Specimen:  Blood from Arm, Left Updated:  03/21/19 2142     WBC 6 22 Thousand/uL      RBC 3 93 Million/uL      Hemoglobin 12 3 g/dL      Hematocrit 36 3 %      MCV 92 fL      MCH 31 3 pg      MCHC 33 9 g/dL      RDW 12 0 %      MPV 10 6 fL      Platelets 523 Thousands/uL      nRBC 0 /100 WBCs      Neutrophils Relative 65 %      Immat GRANS % 0 %      Lymphocytes Relative 21 %      Monocytes Relative 12 %      Eosinophils Relative 1 %      Basophils Relative 1 %      Neutrophils Absolute 4 07 Thousands/µL      Immature Grans Absolute 0 02 Thousand/uL      Lymphocytes Absolute 1 32 Thousands/µL      Monocytes Absolute 0 72 Thousand/µL      Eosinophils Absolute 0 06 Thousand/µL      Basophils Absolute 0 03 Thousands/µL     Rapid drug screen, urine [[id number]]  (Normal) Collected:  03/21/19 2050    Lab Status:  Final result Specimen:  Urine, Clean Catch Updated:  03/21/19 2108     Amph/Meth UR Negative     Barbiturate Ur Negative     Benzodiazepine Urine Negative     Cocaine Urine Negative     Methadone Urine Negative     Opiate Urine Negative     PCP Ur Negative     THC Urine Negative    Narrative:       FOR MEDICAL PURPOSES ONLY     IF CONFIRMATION NEEDED PLEASE CONTACT THE LAB WITHIN 5 DAYS   Drug Screen Cutoff Levels:  AMPHETAMINE/METHAMPHETAMINES  1000 ng/mL  BARBITURATES     200 ng/mL  BENZODIAZEPINES     200 ng/mL  COCAINE      300 ng/mL  METHADONE      300 ng/mL  OPIATES      300 ng/mL  PHENCYCLIDINE     25 ng/mL  THC       50 ng/mL    POCT pregnancy, urine [[id number]]  (Normal) Resulted:  03/21/19 2050    Lab Status:  Final result Specimen:  Urine Updated:  03/21/19 2050     EXT PREG TEST UR (Ref: Negative) NEGATIVE (-)    POCT alcohol breath test [[id number]]  (Normal) Resulted:  03/21/19 2050    Lab Status:  Final result Updated:  03/21/19 2050     EXTBreath Alcohol 0 000                 No orders to display              Procedures  Procedures       Phone Contacts  ED Phone Contact    ED Course                               MDM  Number of Diagnoses or Management Options  Diagnosis management comments: Suicidal ideation currently homeless also history of alcohol abuse will clear medically and consult crisis       Amount and/or Complexity of Data Reviewed  Clinical lab tests: ordered        Disposition  Final diagnoses:   Depression   Suicidal ideations     Time reflects when diagnosis was documented in both MDM as applicable and the Disposition within this note     Time User Action Codes Description Comment    3/22/2019  4:22 AM Tino Brochure Add [F32 9] Depression     3/22/2019  4:22 AM Tino Brochure Add [R45 851] Suicidal ideations       ED Disposition     ED Disposition Condition Date/Time Comment    Transfer to Another [facility]  Fri Mar 22, 2019  4:22 AM Mary Dave should be transferred out to **[place] under Dr Harriet Packer*          MD Documentation      Most Recent Value   Patient Condition  The patient has been stabilized such that within reasonable medical probability, no material deterioration of the patient condition or the condition of the unborn child(kayleigh) is likely to result from the transfer   Reason for Transfer  Level of Care needed not available at this facility   Benefits of Transfer  Specialized equipment and/or services available at the receiving facility (Include comment)________________________   Risks of Transfer  Potential for delay in receiving treatment, Potential deterioration of medical condition   Accepting Physician  Dr Ophelia Bray Name, [address] [facility]    (Name & Tel number)  Crisis   Transported by [employer] and Unit #)  Barry Gandhi   Provider Certification  General risk, such as traffic hazards, adverse weather conditions, rough terrain or turbulence, possible failure of equipment (including vehicle or aircraft), or consequences of actions of persons outside the control of the transport personnel, Unanticipated needs of medical equipment and personnel during transport, Risk of worsening condition, The possibility of a transport vehicle being unavailable      RN Documentation      Most [facility] Name, [facility]    (Name & Tel number)  Crisis   Transported by (Company and Unit #)  [employer]      Follow-up Information    None         Patient's Medications    No medications on file     No discharge procedures on file      ED Provider  Electronically Signed by           Carito Campbell DO  03/22/19 2200

## 2019-03-22 NOTE — ED CARE HANDOFF
Emergency Department Sign Out Note        Sign out and transfer of care from **[facility]*  See Separate Emergency Department note  The patient, Krishna Cuello, was evaluated by the previous provider for ETOH use, thoughts of jumping in front of a car  Workup Completed:  *labs normal**    ED Course / Workup Pending (followup): Signed 201, EMTALA completed, waiting for transport to [facility]                             Procedures  MDM  Number of Diagnoses or Management Options  Depression: new and requires workup  Suicidal ideations: new and requires workup     Amount and/or Complexity of Data Reviewed  Clinical lab tests: ordered and reviewed  Obtain history from someone other than the patient: yes  Discuss the patient with other providers: yes    Patient Progress  Patient progress: improved      Disposition  Final diagnoses:   Depression   Suicidal ideations     Time reflects when diagnosis was documented in both MDM as applicable and the Disposition within this note     Time User Action Codes Description Comment    3/22/2019  4:22 AM Latanya Isidro Add [F32 9] Depression     3/22/2019  4:22 AM Latanya Isidro Add [U63 801] Suicidal ideations       ED Disposition     ED Disposition Condition Date/Time Comment    Transfer to Another [facility]  Fri Mar 22, 2019  4:22 AM Krishna Cuello should be transferred out to [facility] under Dr Fabián Packer*          MD Documentation      Most Recent Value   Patient Condition  The patient has been stabilized such that within reasonable medical probability, no material deterioration of the patient condition or the condition of the unborn child(kayleigh) is likely to result from the transfer   Reason for Transfer  Level of Care needed not available at this facility   Benefits of Transfer  Specialized equipment and/or services available at the receiving facility (Include comment)________________________   Risks of Transfer  Potential for delay in receiving treatment, Potential deterioration of medical condition   Accepting Physician  Dr Alonzo [facility] Ling [place]    (Name & Tel number)  Crisis   Transported by (Company and Unit #)  [employer]her Nu[facility]ez   Provider Certification  General risk, such as traffic hazards, adverse weather conditions, rough terrain or turbulence, possible failure of equipment (including vehicle or aircraft), or consequences of actions of persons outside the control of the transport personnel, Unanticipated needs of medical equipment and personnel during transport, Risk of worsening condition, The possibility of a transport vehicle being unavailable      RN Documentation      [facility] Ling Wilson    (Name & Tel number)  Crisis   Transported by (Company and Unit #)  [employer]      Follow-up Information    None       There are no discharge medications for this patient  No discharge procedures on file         ED Provider  Electronically Signed by     Makayla Da Silva   03/23/19 [address] [place] [address]  03/23/19 4907

## 2020-03-03 NOTE — ED NOTES
Pt SBP 80; Dr Ronaldo Miles is aware and received order for IV fluids       Esther Pabon, RN  07/22/18 9369 Upon review of the In Basket request we were able to locate, review, and update the patient chart as requested for HIV  Any additional questions or concerns should be emailed to the Practice Liaisons via [email or web address] email, please do not reply via In Basket      Thank you  Dinora Burch

## 2021-06-03 ENCOUNTER — VBI (OUTPATIENT)
Dept: ADMINISTRATIVE | Facility: OTHER | Age: 31
End: 2021-06-03

## 2021-06-03 NOTE — TELEPHONE ENCOUNTER
Upon review of the In Basket request we were able to locate, review, and update the patient chart as requested for Chlamydia  Any additional questions or concerns should be emailed to the Practice Liaisons via [email or web address] email, please do not reply via In Basket      Thank you  Janey Grossman

## 2021-06-04 ENCOUNTER — VBI (OUTPATIENT)
Dept: ADMINISTRATIVE | Facility: OTHER | Age: 31
End: 2021-06-04
